# Patient Record
Sex: MALE | Race: WHITE | Employment: FULL TIME | URBAN - METROPOLITAN AREA
[De-identification: names, ages, dates, MRNs, and addresses within clinical notes are randomized per-mention and may not be internally consistent; named-entity substitution may affect disease eponyms.]

---

## 2017-08-16 ENCOUNTER — OFFICE VISIT (OUTPATIENT)
Dept: FAMILY MEDICINE CLINIC | Age: 27
End: 2017-08-16

## 2017-08-16 VITALS
WEIGHT: 228.1 LBS | TEMPERATURE: 97.8 F | HEIGHT: 68 IN | DIASTOLIC BLOOD PRESSURE: 84 MMHG | BODY MASS INDEX: 34.57 KG/M2 | HEART RATE: 96 BPM | OXYGEN SATURATION: 99 % | RESPIRATION RATE: 25 BRPM | SYSTOLIC BLOOD PRESSURE: 126 MMHG

## 2017-08-16 DIAGNOSIS — J02.0 ACUTE STREPTOCOCCAL PHARYNGITIS: Primary | ICD-10-CM

## 2017-08-16 DIAGNOSIS — J03.00 STREP TONSILLITIS: ICD-10-CM

## 2017-08-16 LAB — S PYO AG THROAT QL: POSITIVE

## 2017-08-16 PROCEDURE — G8427 DOCREV CUR MEDS BY ELIG CLIN: HCPCS | Performed by: NURSE PRACTITIONER

## 2017-08-16 PROCEDURE — 99213 OFFICE O/P EST LOW 20 MIN: CPT | Performed by: NURSE PRACTITIONER

## 2017-08-16 PROCEDURE — G8419 CALC BMI OUT NRM PARAM NOF/U: HCPCS | Performed by: NURSE PRACTITIONER

## 2017-08-16 PROCEDURE — 87880 STREP A ASSAY W/OPTIC: CPT | Performed by: NURSE PRACTITIONER

## 2017-08-16 PROCEDURE — 1036F TOBACCO NON-USER: CPT | Performed by: NURSE PRACTITIONER

## 2017-08-16 RX ORDER — PREDNISONE 10 MG/1
TABLET ORAL
Qty: 45 TABLET | Refills: 0 | Status: SHIPPED | OUTPATIENT
Start: 2017-08-16 | End: 2017-09-22 | Stop reason: ALTCHOICE

## 2017-08-16 RX ORDER — AMOXICILLIN AND CLAVULANATE POTASSIUM 875; 125 MG/1; MG/1
1 TABLET, FILM COATED ORAL 2 TIMES DAILY
Qty: 20 TABLET | Refills: 0 | Status: SHIPPED | OUTPATIENT
Start: 2017-08-16 | End: 2017-08-26

## 2017-08-16 ASSESSMENT — ENCOUNTER SYMPTOMS
CHANGE IN BOWEL HABIT: 0
VISUAL CHANGE: 0
VOMITING: 0
NAUSEA: 0
ABDOMINAL PAIN: 0
SORE THROAT: 1
SWOLLEN GLANDS: 1
COUGH: 0

## 2017-08-17 DIAGNOSIS — J02.0 ACUTE STREPTOCOCCAL PHARYNGITIS: ICD-10-CM

## 2017-08-17 DIAGNOSIS — J03.00 STREP TONSILLITIS: ICD-10-CM

## 2017-08-18 LAB
ORGANISM: ABNORMAL
THROAT CULTURE: ABNORMAL
THROAT CULTURE: ABNORMAL

## 2017-09-22 ENCOUNTER — OFFICE VISIT (OUTPATIENT)
Dept: FAMILY MEDICINE CLINIC | Age: 27
End: 2017-09-22

## 2017-09-22 VITALS
SYSTOLIC BLOOD PRESSURE: 126 MMHG | OXYGEN SATURATION: 98 % | HEART RATE: 98 BPM | BODY MASS INDEX: 35.31 KG/M2 | WEIGHT: 233 LBS | HEIGHT: 68 IN | DIASTOLIC BLOOD PRESSURE: 86 MMHG | TEMPERATURE: 97.6 F | RESPIRATION RATE: 16 BRPM

## 2017-09-22 DIAGNOSIS — A09 DIARRHEA OF INFECTIOUS ORIGIN: ICD-10-CM

## 2017-09-22 DIAGNOSIS — H91.92 LOW FREQUENCY HEARING LOSS, LEFT: ICD-10-CM

## 2017-09-22 DIAGNOSIS — E66.09 NON MORBID OBESITY DUE TO EXCESS CALORIES: ICD-10-CM

## 2017-09-22 DIAGNOSIS — J02.9 ACUTE PHARYNGITIS, UNSPECIFIED ETIOLOGY: Primary | ICD-10-CM

## 2017-09-22 DIAGNOSIS — G47.33 OSA (OBSTRUCTIVE SLEEP APNEA): ICD-10-CM

## 2017-09-22 PROCEDURE — G8417 CALC BMI ABV UP PARAM F/U: HCPCS | Performed by: FAMILY MEDICINE

## 2017-09-22 PROCEDURE — G8427 DOCREV CUR MEDS BY ELIG CLIN: HCPCS | Performed by: FAMILY MEDICINE

## 2017-09-22 PROCEDURE — 1036F TOBACCO NON-USER: CPT | Performed by: FAMILY MEDICINE

## 2017-09-22 PROCEDURE — 99214 OFFICE O/P EST MOD 30 MIN: CPT | Performed by: FAMILY MEDICINE

## 2017-09-22 ASSESSMENT — PATIENT HEALTH QUESTIONNAIRE - PHQ9
1. LITTLE INTEREST OR PLEASURE IN DOING THINGS: 0
SUM OF ALL RESPONSES TO PHQ9 QUESTIONS 1 & 2: 0
2. FEELING DOWN, DEPRESSED OR HOPELESS: 0
SUM OF ALL RESPONSES TO PHQ QUESTIONS 1-9: 0

## 2017-09-25 DIAGNOSIS — A09 DIARRHEA OF INFECTIOUS ORIGIN: ICD-10-CM

## 2017-09-26 LAB — CLOSTRIDIUM DIFFICILE DNA AMPLIFICATION: NORMAL

## 2018-11-20 ENCOUNTER — OFFICE VISIT (OUTPATIENT)
Dept: FAMILY MEDICINE CLINIC | Age: 28
End: 2018-11-20
Payer: COMMERCIAL

## 2018-11-20 VITALS
DIASTOLIC BLOOD PRESSURE: 82 MMHG | RESPIRATION RATE: 16 BRPM | BODY MASS INDEX: 36.37 KG/M2 | WEIGHT: 240 LBS | HEIGHT: 68 IN | HEART RATE: 80 BPM | TEMPERATURE: 97.3 F | SYSTOLIC BLOOD PRESSURE: 124 MMHG

## 2018-11-20 DIAGNOSIS — L03.313 CELLULITIS OF CHEST WALL: Primary | ICD-10-CM

## 2018-11-20 DIAGNOSIS — D22.9 NEVUS: ICD-10-CM

## 2018-11-20 PROCEDURE — 1036F TOBACCO NON-USER: CPT | Performed by: FAMILY MEDICINE

## 2018-11-20 PROCEDURE — 99213 OFFICE O/P EST LOW 20 MIN: CPT | Performed by: FAMILY MEDICINE

## 2018-11-20 PROCEDURE — G8484 FLU IMMUNIZE NO ADMIN: HCPCS | Performed by: FAMILY MEDICINE

## 2018-11-20 PROCEDURE — G8417 CALC BMI ABV UP PARAM F/U: HCPCS | Performed by: FAMILY MEDICINE

## 2018-11-20 PROCEDURE — G8427 DOCREV CUR MEDS BY ELIG CLIN: HCPCS | Performed by: FAMILY MEDICINE

## 2018-11-20 RX ORDER — AMOXICILLIN AND CLAVULANATE POTASSIUM 875; 125 MG/1; MG/1
1 TABLET, FILM COATED ORAL 2 TIMES DAILY
Qty: 20 TABLET | Refills: 0 | Status: SHIPPED | OUTPATIENT
Start: 2018-11-20 | End: 2018-11-30

## 2018-11-20 ASSESSMENT — PATIENT HEALTH QUESTIONNAIRE - PHQ9
SUM OF ALL RESPONSES TO PHQ QUESTIONS 1-9: 0
SUM OF ALL RESPONSES TO PHQ9 QUESTIONS 1 & 2: 0
1. LITTLE INTEREST OR PLEASURE IN DOING THINGS: 0
2. FEELING DOWN, DEPRESSED OR HOPELESS: 0
SUM OF ALL RESPONSES TO PHQ QUESTIONS 1-9: 0

## 2018-11-20 NOTE — LETTER
[facility]  [address] [address]  Phone: [phone]  Fax: [phone]    Mirtha Driver MD        November 20, 2018     Patient: Bereket Height   YOB: 1990   Date of Visit: 11/20/2018       To Whom it May Concern:    Ned Bucio was seen in my clinic on 11/20/2018. He will be returning to work 11/26/18. If you have any questions or concerns, please don't hesitate to call.     Sincerely,         Mirtha Driver MD

## 2019-03-12 ENCOUNTER — TELEPHONE (OUTPATIENT)
Dept: FAMILY MEDICINE CLINIC | Age: 29
End: 2019-03-12

## 2023-06-27 PROBLEM — J01.90 ACUTE SINUSITIS: Status: ACTIVE | Noted: 2023-06-27

## 2023-06-27 PROBLEM — J35.1 TONSILLAR HYPERTROPHY: Status: ACTIVE | Noted: 2023-06-27

## 2023-06-27 PROBLEM — J31.2 CHRONIC PHARYNGITIS: Status: ACTIVE | Noted: 2023-06-27

## 2023-06-30 ENCOUNTER — OFFICE VISIT (OUTPATIENT)
Dept: PRIMARY CARE | Facility: CLINIC | Age: 33
End: 2023-06-30
Payer: COMMERCIAL

## 2023-06-30 VITALS
SYSTOLIC BLOOD PRESSURE: 149 MMHG | OXYGEN SATURATION: 97 % | DIASTOLIC BLOOD PRESSURE: 86 MMHG | HEIGHT: 65 IN | WEIGHT: 252 LBS | TEMPERATURE: 97.6 F | BODY MASS INDEX: 41.99 KG/M2 | HEART RATE: 102 BPM

## 2023-06-30 DIAGNOSIS — Z00.00 ROUTINE HEALTH MAINTENANCE: ICD-10-CM

## 2023-06-30 DIAGNOSIS — R94.31 EKG ABNORMALITY: ICD-10-CM

## 2023-06-30 DIAGNOSIS — R07.9 CHEST PAIN, UNSPECIFIED TYPE: Primary | ICD-10-CM

## 2023-06-30 DIAGNOSIS — Z72.0 TOBACCO USE: ICD-10-CM

## 2023-06-30 DIAGNOSIS — R03.0 ELEVATED BLOOD PRESSURE READING: ICD-10-CM

## 2023-06-30 DIAGNOSIS — K21.9 GASTROESOPHAGEAL REFLUX DISEASE, UNSPECIFIED WHETHER ESOPHAGITIS PRESENT: ICD-10-CM

## 2023-06-30 DIAGNOSIS — M79.672 FOOT PAIN, BILATERAL: ICD-10-CM

## 2023-06-30 DIAGNOSIS — Z79.899 ON PRE-EXPOSURE PROPHYLAXIS FOR HIV: ICD-10-CM

## 2023-06-30 DIAGNOSIS — M79.671 FOOT PAIN, BILATERAL: ICD-10-CM

## 2023-06-30 DIAGNOSIS — F41.1 GAD (GENERALIZED ANXIETY DISORDER): ICD-10-CM

## 2023-06-30 DIAGNOSIS — Z00.00 HEALTHCARE MAINTENANCE: ICD-10-CM

## 2023-06-30 PROBLEM — E66.09 NON MORBID OBESITY DUE TO EXCESS CALORIES: Status: ACTIVE | Noted: 2017-09-22

## 2023-06-30 PROBLEM — D22.9 NEVUS: Status: ACTIVE | Noted: 2018-11-20

## 2023-06-30 PROBLEM — G47.33 OSA (OBSTRUCTIVE SLEEP APNEA): Status: ACTIVE | Noted: 2017-09-22

## 2023-06-30 PROCEDURE — 93000 ELECTROCARDIOGRAM COMPLETE: CPT | Performed by: STUDENT IN AN ORGANIZED HEALTH CARE EDUCATION/TRAINING PROGRAM

## 2023-06-30 PROCEDURE — 99204 OFFICE O/P NEW MOD 45 MIN: CPT | Performed by: STUDENT IN AN ORGANIZED HEALTH CARE EDUCATION/TRAINING PROGRAM

## 2023-06-30 RX ORDER — HYDROXYZINE HYDROCHLORIDE 25 MG/1
25 TABLET, FILM COATED ORAL 2 TIMES DAILY PRN
Qty: 60 TABLET | Refills: 0 | Status: SHIPPED | OUTPATIENT
Start: 2023-06-30 | End: 2023-07-30

## 2023-06-30 RX ORDER — OMEPRAZOLE 20 MG/1
20 CAPSULE, DELAYED RELEASE ORAL DAILY
COMMUNITY
End: 2023-11-03 | Stop reason: WASHOUT

## 2023-06-30 RX ORDER — ESCITALOPRAM OXALATE 10 MG/1
10 TABLET ORAL DAILY
Qty: 30 TABLET | Refills: 2 | Status: SHIPPED | OUTPATIENT
Start: 2023-06-30 | End: 2023-08-04 | Stop reason: ALTCHOICE

## 2023-06-30 RX ORDER — EMTRICITABINE AND TENOFOVIR DISOPROXIL FUMARATE 200; 300 MG/1; MG/1
1 TABLET, FILM COATED ORAL DAILY
COMMUNITY
Start: 2023-06-02

## 2023-06-30 ASSESSMENT — ANXIETY QUESTIONNAIRES
7. FEELING AFRAID AS IF SOMETHING AWFUL MIGHT HAPPEN: NEARLY EVERY DAY
5. BEING SO RESTLESS THAT IT IS HARD TO SIT STILL: NOT AT ALL
3. WORRYING TOO MUCH ABOUT DIFFERENT THINGS: NEARLY EVERY DAY
GAD7 TOTAL SCORE: 16
1. FEELING NERVOUS, ANXIOUS, OR ON EDGE: NEARLY EVERY DAY
6. BECOMING EASILY ANNOYED OR IRRITABLE: SEVERAL DAYS
IF YOU CHECKED OFF ANY PROBLEMS ON THIS QUESTIONNAIRE, HOW DIFFICULT HAVE THESE PROBLEMS MADE IT FOR YOU TO DO YOUR WORK, TAKE CARE OF THINGS AT HOME, OR GET ALONG WITH OTHER PEOPLE: SOMEWHAT DIFFICULT
2. NOT BEING ABLE TO STOP OR CONTROL WORRYING: NEARLY EVERY DAY
4. TROUBLE RELAXING: NEARLY EVERY DAY

## 2023-06-30 ASSESSMENT — ENCOUNTER SYMPTOMS
DIZZINESS: 1
PALPITATIONS: 0
VOMITING: 0
CHILLS: 0
LIGHT-HEADEDNESS: 0
SHORTNESS OF BREATH: 0
NAUSEA: 0
FEVER: 0
DYSURIA: 0
DIAPHORESIS: 0

## 2023-06-30 ASSESSMENT — PATIENT HEALTH QUESTIONNAIRE - PHQ9
SUM OF ALL RESPONSES TO PHQ9 QUESTIONS 1 AND 2: 0
1. LITTLE INTEREST OR PLEASURE IN DOING THINGS: NOT AT ALL
2. FEELING DOWN, DEPRESSED OR HOPELESS: NOT AT ALL

## 2023-06-30 NOTE — PATIENT INSTRUCTIONS
Please schedule a physical with me in 1 month on your way out.  Please go to patient [facility] which is located in the café to schedule the psychology referral, echocardiogram, and infectious disease referral  Please obtain fasting blood work at your nearest  lab.  Please fast for 8 hours prior to obtaining.  Start taking Lexapro once a day and hydroxyzine twice a day as needed for anxiety.  Let us know if you have any issues with the anxiety medicines.

## 2023-06-30 NOTE — PROGRESS NOTES
"Subjective   Patient ID: Bryant Goddard is a 33 y.o. male who presents for New Patient Visit.  He is here to establish care.    Reports hx of anxiety which has been worse than usual, more crying than usual. No hx of anxiety in childhood but more prominent in adulthood. No SI/HI. Feels safe at work and at home. Most anxiety is at work.    Has episodes of left shoulder pain and chest pain that lasts a few seconds and goes away. Feels a rush of dizziness and resolves after seconds. Unclear trigger.    Feet hurt in the AM at times and improves during the day.    PMH: GERD, elevated BP reading    Prescribed truvada through [facility].    Not sexually active. MSM when active.     Social History: Works for [employer]. Been there for about 6 years. Keeps in touch with sister over phone. Came to [place] for college and mom is from [place], parent's from here.        Review of Systems   Constitutional:  Negative for chills, diaphoresis and fever.   HENT:  Negative for hearing loss.    Eyes:  Negative for visual disturbance.   Respiratory:  Negative for shortness of breath.    Cardiovascular:  Positive for chest pain. Negative for palpitations and leg swelling.   Gastrointestinal:  Negative for nausea and vomiting.   Endocrine: Negative for cold intolerance and heat intolerance.   Genitourinary:  Negative for dysuria.   Skin:  Negative for rash.   Neurological:  Positive for dizziness. Negative for light-headedness.       /86   Pulse 102   Temp 36.4 °C (97.6 °F)   Ht 1.638 m (5' 4.5\")   Wt 114 kg (252 lb)   SpO2 97%   BMI 42.59 kg/m²     Objective   Physical Exam  Vitals reviewed.   Constitutional:       General: He is not in acute distress.     Appearance: Normal appearance. He is obese.   HENT:      Head: Normocephalic.   Cardiovascular:      Rate and Rhythm: Normal rate and regular rhythm.   Pulmonary:      Effort: Pulmonary effort is normal. No respiratory distress.      " Breath sounds: Normal breath sounds.   Abdominal:      General: There is no distension.   Musculoskeletal:         General: No deformity.   Skin:     Coloration: Skin is not jaundiced.   Neurological:      Mental Status: He is alert.         Assessment/Plan   Problem List Items Addressed This Visit       Elevated blood pressure reading     Recheck in 4-6 weeks.  Routine labs ordered         Relevant Orders    Transthoracic echo (TTE) complete    EKG abnormality    Relevant Orders    Transthoracic echo (TTE) complete    Chest pain - Primary     EKG ordered due to chest pain less likely to be cardiac given his history although he does have elevated blood pressure today.  EKG showed horizontal axis slight intraventricular conduction delay but otherwise unremarkable.  For this finding I have ordered an echo. ER if symptoms worsen         Relevant Orders    ECG 12 lead (Clinic Performed) (Completed)    Routine health maintenance    Relevant Orders    Lipid Panel    Comprehensive Metabolic Panel    TSH with reflex to Free T4 if abnormal    CBC    On pre-exposure prophylaxis for HIV     Referred to infectious disease for management of Truvada.         Relevant Orders    Referral to Infectious Disease    MANUEL (generalized anxiety disorder)     MANUEL-7 is 16 which is consistent with anxiety.  For this we will start Lexapro 10 mg a day, hydroxyzine 25 mg twice daily as needed for anxiety.  I have also referred him to psychology to establish with therapy.  Follow-up with me in 4 to 6 weeks to discuss efficacy.  Advised common side effects of SSRIs and let us know if the side effects have not.         Relevant Medications    escitalopram (Lexapro) 10 mg tablet    hydrOXYzine HCL (Atarax) 25 mg tablet    Other Relevant Orders    Referral to Psychology    Healthcare maintenance     Routine labs ordered.  Follow-up in 1 to 3 months for physical and to discuss anxiety or sooner if needed.         Relevant Orders    Follow Up In  [facility] - PCP - Health Maintenance    Foot pain, bilateral     Will address at next visit.         Gastroesophageal reflux disease     Continue omeprazole 20mg/day         Tobacco use     Advised to stop smoking.  We will continue to explore this at future visits.

## 2023-07-02 PROBLEM — Z79.899 ON PRE-EXPOSURE PROPHYLAXIS FOR HIV: Status: ACTIVE | Noted: 2023-07-02

## 2023-07-02 PROBLEM — K21.9 GASTROESOPHAGEAL REFLUX DISEASE: Status: ACTIVE | Noted: 2023-07-02

## 2023-07-02 PROBLEM — F41.1 GAD (GENERALIZED ANXIETY DISORDER): Status: ACTIVE | Noted: 2023-07-02

## 2023-07-02 PROBLEM — R03.0 ELEVATED BLOOD PRESSURE READING: Status: ACTIVE | Noted: 2023-07-02

## 2023-07-02 PROBLEM — Z72.0 TOBACCO USE: Status: ACTIVE | Noted: 2023-07-02

## 2023-07-02 PROBLEM — R07.9 CHEST PAIN: Status: ACTIVE | Noted: 2023-07-02

## 2023-07-02 PROBLEM — R94.31 EKG ABNORMALITY: Status: ACTIVE | Noted: 2023-07-02

## 2023-07-02 PROBLEM — M79.672 FOOT PAIN, BILATERAL: Status: ACTIVE | Noted: 2023-07-02

## 2023-07-02 PROBLEM — Z00.00 ROUTINE HEALTH MAINTENANCE: Status: ACTIVE | Noted: 2023-07-02

## 2023-07-02 PROBLEM — Z00.00 HEALTHCARE MAINTENANCE: Status: ACTIVE | Noted: 2023-07-02

## 2023-07-02 PROBLEM — M79.671 FOOT PAIN, BILATERAL: Status: ACTIVE | Noted: 2023-07-02

## 2023-07-02 NOTE — ASSESSMENT & PLAN NOTE
MANUEL-7 is 16 which is consistent with anxiety.  For this we will start Lexapro 10 mg a day, hydroxyzine 25 mg twice daily as needed for anxiety.  I have also referred him to psychology to establish with therapy.  Follow-up with me in 4 to 6 weeks to discuss efficacy.  Advised common side effects of SSRIs and let us know if the side effects have not.

## 2023-07-02 NOTE — ASSESSMENT & PLAN NOTE
EKG ordered due to chest pain less likely to be cardiac given his history although he does have elevated blood pressure today.  EKG showed horizontal axis slight intraventricular conduction delay but otherwise unremarkable.  For this finding I have ordered an echo. ER if symptoms worsen

## 2023-07-02 NOTE — ASSESSMENT & PLAN NOTE
Routine labs ordered.  Follow-up in 1 to 3 months for physical and to discuss anxiety or sooner if needed.

## 2023-07-21 ENCOUNTER — LAB (OUTPATIENT)
Dept: LAB | Facility: LAB | Age: 33
End: 2023-07-21
Payer: COMMERCIAL

## 2023-07-21 DIAGNOSIS — Z00.00 ROUTINE HEALTH MAINTENANCE: ICD-10-CM

## 2023-07-21 LAB
ALANINE AMINOTRANSFERASE (SGPT) (U/L) IN SER/PLAS: 30 U/L (ref 10–52)
ALBUMIN (G/DL) IN SER/PLAS: 4.8 G/DL (ref 3.4–5)
ALKALINE PHOSPHATASE (U/L) IN SER/PLAS: 73 U/L (ref 33–120)
ANION GAP IN SER/PLAS: 11 MMOL/L (ref 10–20)
ASPARTATE AMINOTRANSFERASE (SGOT) (U/L) IN SER/PLAS: 16 U/L (ref 9–39)
BILIRUBIN TOTAL (MG/DL) IN SER/PLAS: 1.1 MG/DL (ref 0–1.2)
CALCIUM (MG/DL) IN SER/PLAS: 9.5 MG/DL (ref 8.6–10.3)
CARBON DIOXIDE, TOTAL (MMOL/L) IN SER/PLAS: 30 MMOL/L (ref 21–32)
CHLORIDE (MMOL/L) IN SER/PLAS: 101 MMOL/L (ref 98–107)
CHOLESTEROL (MG/DL) IN SER/PLAS: 151 MG/DL (ref 0–199)
CHOLESTEROL IN HDL (MG/DL) IN SER/PLAS: 46.8 MG/DL
CHOLESTEROL/HDL RATIO: 3.2
CREATININE (MG/DL) IN SER/PLAS: 0.92 MG/DL (ref 0.5–1.3)
ERYTHROCYTE DISTRIBUTION WIDTH (RATIO) BY AUTOMATED COUNT: 12.9 % (ref 11.5–14.5)
ERYTHROCYTE MEAN CORPUSCULAR HEMOGLOBIN CONCENTRATION (G/DL) BY AUTOMATED: 35.2 G/DL (ref 32–36)
ERYTHROCYTE MEAN CORPUSCULAR VOLUME (FL) BY AUTOMATED COUNT: 84 FL (ref 80–100)
ERYTHROCYTES (10*6/UL) IN BLOOD BY AUTOMATED COUNT: 5.73 X10E12/L (ref 4.5–5.9)
GFR MALE: >90 ML/MIN/1.73M2
GLUCOSE (MG/DL) IN SER/PLAS: 87 MG/DL (ref 74–99)
HEMATOCRIT (%) IN BLOOD BY AUTOMATED COUNT: 48.3 % (ref 41–52)
HEMOGLOBIN (G/DL) IN BLOOD: 17 G/DL (ref 13.5–17.5)
LDL: 82 MG/DL (ref 0–99)
LEUKOCYTES (10*3/UL) IN BLOOD BY AUTOMATED COUNT: 7.7 X10E9/L (ref 4.4–11.3)
PLATELETS (10*3/UL) IN BLOOD AUTOMATED COUNT: 189 X10E9/L (ref 150–450)
POTASSIUM (MMOL/L) IN SER/PLAS: 4.3 MMOL/L (ref 3.5–5.3)
PROTEIN TOTAL: 7.5 G/DL (ref 6.4–8.2)
SODIUM (MMOL/L) IN SER/PLAS: 138 MMOL/L (ref 136–145)
THYROTROPIN (MIU/L) IN SER/PLAS BY DETECTION LIMIT <= 0.05 MIU/L: 1.07 MIU/L (ref 0.44–3.98)
TRIGLYCERIDE (MG/DL) IN SER/PLAS: 112 MG/DL (ref 0–149)
UREA NITROGEN (MG/DL) IN SER/PLAS: 17 MG/DL (ref 6–23)
VLDL: 22 MG/DL (ref 0–40)

## 2023-07-21 PROCEDURE — 80061 LIPID PANEL: CPT

## 2023-07-21 PROCEDURE — 84443 ASSAY THYROID STIM HORMONE: CPT

## 2023-07-21 PROCEDURE — 36415 COLL VENOUS BLD VENIPUNCTURE: CPT

## 2023-07-21 PROCEDURE — 80053 COMPREHEN METABOLIC PANEL: CPT

## 2023-07-21 PROCEDURE — 85027 COMPLETE CBC AUTOMATED: CPT

## 2023-08-02 ASSESSMENT — PROMIS GLOBAL HEALTH SCALE
RATE_MENTAL_HEALTH: VERY GOOD
RATE_PHYSICAL_HEALTH: FAIR
RATE_GENERAL_HEALTH: FAIR
RATE_AVERAGE_FATIGUE: MILD
RATE_QUALITY_OF_LIFE: VERY GOOD
RATE_SOCIAL_SATISFACTION: GOOD
RATE_AVERAGE_PAIN: 3
CARRYOUT_PHYSICAL_ACTIVITIES: COMPLETELY
EMOTIONAL_PROBLEMS: SOMETIMES
CARRYOUT_SOCIAL_ACTIVITIES: GOOD

## 2023-08-04 ENCOUNTER — OFFICE VISIT (OUTPATIENT)
Dept: PRIMARY CARE | Facility: CLINIC | Age: 33
End: 2023-08-04
Payer: COMMERCIAL

## 2023-08-04 VITALS
OXYGEN SATURATION: 97 % | HEIGHT: 65 IN | WEIGHT: 256.2 LBS | HEART RATE: 98 BPM | BODY MASS INDEX: 42.69 KG/M2 | SYSTOLIC BLOOD PRESSURE: 126 MMHG | DIASTOLIC BLOOD PRESSURE: 74 MMHG | TEMPERATURE: 96.7 F

## 2023-08-04 DIAGNOSIS — M79.671 FOOT PAIN, BILATERAL: ICD-10-CM

## 2023-08-04 DIAGNOSIS — R94.31 EKG ABNORMALITY: ICD-10-CM

## 2023-08-04 DIAGNOSIS — Z23 ENCOUNTER FOR IMMUNIZATION: ICD-10-CM

## 2023-08-04 DIAGNOSIS — Z79.899 ON PRE-EXPOSURE PROPHYLAXIS FOR HIV: ICD-10-CM

## 2023-08-04 DIAGNOSIS — K21.9 GASTROESOPHAGEAL REFLUX DISEASE, UNSPECIFIED WHETHER ESOPHAGITIS PRESENT: ICD-10-CM

## 2023-08-04 DIAGNOSIS — R07.9 CHEST PAIN, UNSPECIFIED TYPE: ICD-10-CM

## 2023-08-04 DIAGNOSIS — M79.672 FOOT PAIN, BILATERAL: ICD-10-CM

## 2023-08-04 DIAGNOSIS — Z72.0 TOBACCO USE: ICD-10-CM

## 2023-08-04 DIAGNOSIS — Z00.00 HEALTHCARE MAINTENANCE: ICD-10-CM

## 2023-08-04 DIAGNOSIS — F41.1 GAD (GENERALIZED ANXIETY DISORDER): Primary | ICD-10-CM

## 2023-08-04 PROCEDURE — 90471 IMMUNIZATION ADMIN: CPT | Performed by: STUDENT IN AN ORGANIZED HEALTH CARE EDUCATION/TRAINING PROGRAM

## 2023-08-04 PROCEDURE — 99395 PREV VISIT EST AGE 18-39: CPT | Performed by: STUDENT IN AN ORGANIZED HEALTH CARE EDUCATION/TRAINING PROGRAM

## 2023-08-04 PROCEDURE — 3008F BODY MASS INDEX DOCD: CPT | Performed by: STUDENT IN AN ORGANIZED HEALTH CARE EDUCATION/TRAINING PROGRAM

## 2023-08-04 PROCEDURE — 90715 TDAP VACCINE 7 YRS/> IM: CPT | Performed by: STUDENT IN AN ORGANIZED HEALTH CARE EDUCATION/TRAINING PROGRAM

## 2023-08-04 RX ORDER — ESCITALOPRAM OXALATE 20 MG/1
20 TABLET ORAL DAILY
Qty: 30 TABLET | Refills: 2 | Status: SHIPPED | OUTPATIENT
Start: 2023-08-04 | End: 2023-10-20

## 2023-08-04 ASSESSMENT — ENCOUNTER SYMPTOMS
CHILLS: 0
NUMBNESS: 0
UNEXPECTED WEIGHT CHANGE: 0
VOMITING: 0
SHORTNESS OF BREATH: 0
DIARRHEA: 0
DYSURIA: 0
FEVER: 0
DIAPHORESIS: 0
NAUSEA: 0

## 2023-08-04 ASSESSMENT — PATIENT HEALTH QUESTIONNAIRE - PHQ9
SUM OF ALL RESPONSES TO PHQ9 QUESTIONS 1 AND 2: 0
2. FEELING DOWN, DEPRESSED OR HOPELESS: NOT AT ALL
1. LITTLE INTEREST OR PLEASURE IN DOING THINGS: NOT AT ALL

## 2023-08-04 ASSESSMENT — ANXIETY QUESTIONNAIRES
7. FEELING AFRAID AS IF SOMETHING AWFUL MIGHT HAPPEN: SEVERAL DAYS
4. TROUBLE RELAXING: NOT AT ALL
2. NOT BEING ABLE TO STOP OR CONTROL WORRYING: SEVERAL DAYS
6. BECOMING EASILY ANNOYED OR IRRITABLE: SEVERAL DAYS
3. WORRYING TOO MUCH ABOUT DIFFERENT THINGS: SEVERAL DAYS
5. BEING SO RESTLESS THAT IT IS HARD TO SIT STILL: NOT AT ALL
1. FEELING NERVOUS, ANXIOUS, OR ON EDGE: MORE THAN HALF THE DAYS
GAD7 TOTAL SCORE: 6

## 2023-08-04 NOTE — PROGRESS NOTES
"Subjective   Patient ID: Bryant Goddard is a 33 y.o. male who presents for Annual Exam.  He is here for CPE.    Since starting lexapro, has still has some anxiety and requiring hydroxyzine. No loss of libido and no N/V/D. No SI/HI.     Has bilateral foot pain. Tries to wear shoes with supports. Has pain when waking up in bottom of the foot. Improves as the day goes on. Has tried inserts in shoes without benefit. Present for at least a year.     Shoulder/ chest pain is resolved.          Review of Systems   Constitutional:  Negative for chills, diaphoresis, fever and unexpected weight change.   HENT:  Negative for hearing loss.    Eyes:  Negative for visual disturbance.   Respiratory:  Negative for shortness of breath.    Cardiovascular:  Negative for chest pain.   Gastrointestinal:  Negative for diarrhea, nausea and vomiting.   Endocrine: Negative for cold intolerance and heat intolerance.   Genitourinary:  Negative for dysuria.   Skin:  Negative for rash.   Neurological:  Negative for numbness.       /74   Pulse 98   Temp 35.9 °C (96.7 °F)   Ht 1.638 m (5' 4.5\")   Wt 116 kg (256 lb 3.2 oz)   SpO2 97%   BMI 43.30 kg/m²     Objective   Physical Exam  Vitals reviewed.   Constitutional:       General: He is not in acute distress.     Appearance: Normal appearance.   HENT:      Head: Normocephalic.      Right Ear: Tympanic membrane, ear canal and external ear normal. There is no impacted cerumen.      Left Ear: Ear canal and external ear normal. There is impacted cerumen.      Mouth/Throat:      Mouth: Mucous membranes are moist.      Pharynx: Oropharynx is clear. No oropharyngeal exudate or posterior oropharyngeal erythema.   Cardiovascular:      Rate and Rhythm: Normal rate and regular rhythm.   Pulmonary:      Effort: Pulmonary effort is normal. No respiratory distress.      Breath sounds: Normal breath sounds.   Abdominal:      General: Bowel sounds are normal. There is no distension.      Palpations: " Abdomen is soft. There is no mass.      Tenderness: There is no abdominal tenderness. There is no guarding or rebound.   Musculoskeletal:         General: No deformity.      Cervical back: Neck supple. No tenderness.   Lymphadenopathy:      Cervical: No cervical adenopathy.   Skin:     Coloration: Skin is not jaundiced.   Neurological:      Mental Status: He is alert.         Assessment/Plan   Problem List Items Addressed This Visit       EKG abnormality    Chest pain    On pre-exposure prophylaxis for HIV    MANUEL (generalized anxiety disorder) - Primary    Relevant Medications    escitalopram (Lexapro) 20 mg tablet    Healthcare maintenance    Relevant Orders    Follow Up In [facility] - PCP - Established    Follow Up In [facility] - PCP - Health Maintenance    Foot pain, bilateral    Gastroesophageal reflux disease    BMI 40.0-44.9, adult (CMS/[facility])    Tobacco use    Encounter for immunization    Relevant Orders    Tdap vaccine, age 10 years and older  (BOOSTRIX) (Completed)   Hypertension  Chest pain  EKG abnormality  -Resolved. BP normal today  -Echo normal.    MANUEL  -MANUEL-7 16-> 6. Tolerating lexapro well.  -Increase lexapro to 20mg/day. Use hydroxyzine only PRN and try to wean.    On preexposure prophylaxis for HIV  -Referred to ID for truvada    Foot pain  -Likely plantar fasciitis.   -Advised stretches and work on wt loss.  -Can refer to podiatry if persistent.     BMI 43  -He will continue to work on diet and exercise.  - Follow-up with me in 3 months to discuss weight loss if needed.    GERD  - Continue omeprazole    Tobacco use  - He will continue to try and cut back on smoking.    CPE completed.  Advised to keep a heart healthy, low fat  diet with fruits and veggies like Mediterranean diet.  Advised on the importance of exercise and maintaining 150 minutes of exercise per week (30 minutes per day 5 days a week).  Advised on regular eye and dental visits.  Discussed age appropriate  cancer screening, immunizations and recommendations given.  Discussed avoiding illicit drugs and tobacco. Advised on appropriate use of alcohol.  Advised to wear seat belt.    Health Maintenance  -Prostate Cancer Screening: Age 50  -Vaccinations: Unsure last TDAP-ordered. Rec flu after labor day. Rec covid bivalent booster.   -Lung Cancer Screening:  Not indicated  -AAA Screening: Age 65  -Colonoscopy: Age 45  -Screen for HIV/Hep C: Truvada, reports screened in past.

## 2023-08-05 PROBLEM — Z23 ENCOUNTER FOR IMMUNIZATION: Status: ACTIVE | Noted: 2023-08-05

## 2023-10-20 DIAGNOSIS — F41.1 GAD (GENERALIZED ANXIETY DISORDER): ICD-10-CM

## 2023-10-20 RX ORDER — ESCITALOPRAM OXALATE 20 MG/1
20 TABLET ORAL DAILY
Qty: 30 TABLET | Refills: 11 | Status: SHIPPED | OUTPATIENT
Start: 2023-10-20

## 2023-11-03 ENCOUNTER — OFFICE VISIT (OUTPATIENT)
Dept: PRIMARY CARE | Facility: CLINIC | Age: 33
End: 2023-11-03
Payer: COMMERCIAL

## 2023-11-03 VITALS
SYSTOLIC BLOOD PRESSURE: 128 MMHG | OXYGEN SATURATION: 98 % | HEIGHT: 67 IN | BODY MASS INDEX: 41.44 KG/M2 | HEART RATE: 97 BPM | DIASTOLIC BLOOD PRESSURE: 84 MMHG | RESPIRATION RATE: 16 BRPM | WEIGHT: 264 LBS

## 2023-11-03 DIAGNOSIS — K21.9 GASTROESOPHAGEAL REFLUX DISEASE, UNSPECIFIED WHETHER ESOPHAGITIS PRESENT: ICD-10-CM

## 2023-11-03 DIAGNOSIS — Z00.00 HEALTHCARE MAINTENANCE: ICD-10-CM

## 2023-11-03 DIAGNOSIS — F41.1 GAD (GENERALIZED ANXIETY DISORDER): Primary | ICD-10-CM

## 2023-11-03 PROBLEM — E66.01 OBESITY, MORBID (MORE THAN 100 LBS OVER IDEAL WEIGHT OR BMI > 40) (MULTI): Status: ACTIVE | Noted: 2023-11-03

## 2023-11-03 PROCEDURE — 3008F BODY MASS INDEX DOCD: CPT | Performed by: STUDENT IN AN ORGANIZED HEALTH CARE EDUCATION/TRAINING PROGRAM

## 2023-11-03 PROCEDURE — 99213 OFFICE O/P EST LOW 20 MIN: CPT | Performed by: STUDENT IN AN ORGANIZED HEALTH CARE EDUCATION/TRAINING PROGRAM

## 2023-11-03 ASSESSMENT — ENCOUNTER SYMPTOMS
CHILLS: 0
FEVER: 0
VOMITING: 0
DIZZINESS: 0
DIAPHORESIS: 0
DYSURIA: 0
SHORTNESS OF BREATH: 0
DIARRHEA: 0
LIGHT-HEADEDNESS: 0
NAUSEA: 0

## 2023-11-03 ASSESSMENT — ANXIETY QUESTIONNAIRES
2. NOT BEING ABLE TO STOP OR CONTROL WORRYING: SEVERAL DAYS
4. TROUBLE RELAXING: NOT AT ALL
1. FEELING NERVOUS, ANXIOUS, OR ON EDGE: SEVERAL DAYS
7. FEELING AFRAID AS IF SOMETHING AWFUL MIGHT HAPPEN: NOT AT ALL
GAD7 TOTAL SCORE: 3
3. WORRYING TOO MUCH ABOUT DIFFERENT THINGS: SEVERAL DAYS
5. BEING SO RESTLESS THAT IT IS HARD TO SIT STILL: NOT AT ALL
6. BECOMING EASILY ANNOYED OR IRRITABLE: NOT AT ALL

## 2023-11-03 ASSESSMENT — PATIENT HEALTH QUESTIONNAIRE - PHQ9
SUM OF ALL RESPONSES TO PHQ9 QUESTIONS 1 & 2: 0
1. LITTLE INTEREST OR PLEASURE IN DOING THINGS: NOT AT ALL
2. FEELING DOWN, DEPRESSED OR HOPELESS: NOT AT ALL

## 2023-11-03 NOTE — PROGRESS NOTES
"Subjective   Patient ID: Bryant Goddard is a 33 y.o. male who presents for Anxiety.    Pt is here today to follow upon anxiety. Pt had his lexapro increased to 20 mg.pt states that he thinks this dosage is working well for him. He still has breakthrough anxiety but note as much as he used to. Pt admits that he does forghet to take it sometimes but has been trying to be more consistant. Was going to the gym and eating healthy and lost 10 pounds. Has used hydroxyzine in the past but not using very frequently, helps some but sometimes feels he over sleeps with it.         Review of Systems   Constitutional:  Negative for chills, diaphoresis and fever.   HENT:  Negative for hearing loss.    Eyes:  Negative for visual disturbance.   Respiratory:  Negative for shortness of breath.    Cardiovascular:  Negative for chest pain.   Gastrointestinal:  Negative for diarrhea, nausea and vomiting.   Endocrine: Negative for cold intolerance and heat intolerance.   Genitourinary:  Negative for dysuria.   Skin:  Negative for rash.   Neurological:  Negative for dizziness and light-headedness.   Psychiatric/Behavioral:  Negative for suicidal ideas.        Objective   /84   Pulse 97   Resp 16   Ht 1.702 m (5' 7\")   Wt 120 kg (264 lb)   SpO2 98%   BMI 41.35 kg/m²     Physical Exam  Vitals reviewed.   Constitutional:       General: He is not in acute distress.     Appearance: Normal appearance.   HENT:      Head: Normocephalic.   Cardiovascular:      Rate and Rhythm: Normal rate and regular rhythm.   Pulmonary:      Effort: Pulmonary effort is normal. No respiratory distress.      Breath sounds: Normal breath sounds.   Abdominal:      General: There is no distension.   Musculoskeletal:         General: No deformity.   Skin:     Coloration: Skin is not jaundiced.   Neurological:      Mental Status: He is alert.         Assessment/Plan   Problem List Items Addressed This Visit       MANUEL (generalized anxiety disorder) - Primary "    Healthcare maintenance    Gastroesophageal reflux disease    BMI 40.0-44.9, adult (CMS/HCC)   Hypertension  Chest pain  EKG abnormality  -Resolved. BP normal today  -Echo normal.     MANEUL  -MANUEL-7 16-> 6 ->3. Tolerating lexapro well.  -Continue lexapro 20mg/day. Use hydroxyzine only PRN and try to wean. Discussed could try hydroxyzine 10mg PRN and will message us if he wants to try it.   -F/u virtually for anxiety if needed.     On preexposure prophylaxis for HIV  -Referred to ID for truvada, hasn't seen yet     Foot pain  -Likely plantar fasciitis.   -Advised stretches and work on wt loss.  -Can refer to podiatry if persistent. Resolved today.     BMI 41  -He will continue to work on diet and exercise.  - Follow-up with me in 3 months to discuss weight loss if needed.     GERD  - Was on omeprazole in the past.  - Apple cider vinegar helps this. Discussed could try pepcid. Let us know if symptoms return or worsen.      Tobacco use  - He will continue to try and cut back on smoking. Not using currently    Health Maintenance  -Prostate Cancer Screening: Age 50  -Vaccinations: Unsure last TDAP-ordered. Rec flu after labor day. Rec covid bivalent booster.   -Lung Cancer Screening:  Not indicated  -AAA Screening: Age 65  -Colonoscopy: Age 45  -Screen for HIV/Hep C: Truvada, reports screened in past.     CPE 8/24, f/u sooner if needed.

## 2024-06-14 DIAGNOSIS — H92.09 OTALGIA, UNSPECIFIED LATERALITY: Primary | ICD-10-CM

## 2024-06-19 ENCOUNTER — TELEPHONE (OUTPATIENT)
Dept: PRIMARY CARE | Facility: CLINIC | Age: 34
End: 2024-06-19
Payer: COMMERCIAL

## 2024-06-19 NOTE — TELEPHONE ENCOUNTER
Patient was seen at an urgent care for wax removal 2 weeks ago bp was checked at that visit reading was 134/90       Patient advised he has been checking bp at home some reading were 155/80, 149/86 pulse 90      Has follow up for bp issues 6/21 with Dr. Wood

## 2024-06-21 ENCOUNTER — OFFICE VISIT (OUTPATIENT)
Dept: PRIMARY CARE | Facility: CLINIC | Age: 34
End: 2024-06-21
Payer: COMMERCIAL

## 2024-06-21 VITALS
RESPIRATION RATE: 16 BRPM | SYSTOLIC BLOOD PRESSURE: 156 MMHG | HEART RATE: 100 BPM | OXYGEN SATURATION: 99 % | BODY MASS INDEX: 41.75 KG/M2 | DIASTOLIC BLOOD PRESSURE: 98 MMHG | HEIGHT: 67 IN | WEIGHT: 266 LBS

## 2024-06-21 DIAGNOSIS — Z00.00 ROUTINE HEALTH MAINTENANCE: Primary | ICD-10-CM

## 2024-06-21 DIAGNOSIS — R03.0 ELEVATED BLOOD PRESSURE READING: ICD-10-CM

## 2024-06-21 DIAGNOSIS — F41.1 GAD (GENERALIZED ANXIETY DISORDER): ICD-10-CM

## 2024-06-21 ASSESSMENT — ENCOUNTER SYMPTOMS
VOMITING: 0
DIZZINESS: 0
SHORTNESS OF BREATH: 0
NUMBNESS: 0
DIAPHORESIS: 0
PALPITATIONS: 0
MYALGIAS: 0
CHILLS: 0
FEVER: 0
WEAKNESS: 0
LIGHT-HEADEDNESS: 0
NAUSEA: 0
DIARRHEA: 0

## 2024-06-21 ASSESSMENT — ANXIETY QUESTIONNAIRES
7. FEELING AFRAID AS IF SOMETHING AWFUL MIGHT HAPPEN: NOT AT ALL
5. BEING SO RESTLESS THAT IT IS HARD TO SIT STILL: NOT AT ALL
1. FEELING NERVOUS, ANXIOUS, OR ON EDGE: SEVERAL DAYS
6. BECOMING EASILY ANNOYED OR IRRITABLE: SEVERAL DAYS
2. NOT BEING ABLE TO STOP OR CONTROL WORRYING: NOT AT ALL
4. TROUBLE RELAXING: NOT AT ALL
3. WORRYING TOO MUCH ABOUT DIFFERENT THINGS: NOT AT ALL
GAD7 TOTAL SCORE: 2

## 2024-06-21 NOTE — PROGRESS NOTES
"Subjective   Patient ID: Bryant Goddard is a 34 y.o. male who presents for Hypertension (/).  HPI    Here for f/u HTN.    Had a few short lived episodes of chest pain that lasted a few seconds and then resolved spontaneously. Provided BP readings over last few days. Would wait for at least 5 minutes and take BP. He has an omron cuff at home. Doesn't feel anxious.     Life is going ok. Feels better than he was in last year. Off lexapro. Work is going ok. Not in relationship.     Doesn't do much physical activity, hasn't gone to gym recently. Diet at home can vary and does get fast food on occasion. Drinks a lot of diet coke, may have up to 12 diet cokes a day. Drinks coffee with cream and sugar, 1-2 a day.        Review of Systems   Constitutional:  Negative for chills, diaphoresis and fever.   Respiratory:  Negative for shortness of breath.    Cardiovascular:  Negative for chest pain, palpitations and leg swelling.   Gastrointestinal:  Negative for diarrhea, nausea and vomiting.   Musculoskeletal:  Negative for myalgias.   Neurological:  Negative for dizziness, syncope, weakness, light-headedness and numbness.       BP (!) 156/98   Pulse 100   Resp 16   Ht 1.702 m (5' 7\")   Wt 121 kg (266 lb)   SpO2 99%   BMI 41.66 kg/m²     Objective   Physical Exam  Vitals reviewed.   Constitutional:       General: He is not in acute distress.     Appearance: Normal appearance.   HENT:      Head: Normocephalic.   Cardiovascular:      Rate and Rhythm: Normal rate and regular rhythm.   Pulmonary:      Effort: Pulmonary effort is normal. No respiratory distress.      Breath sounds: Normal breath sounds.   Abdominal:      General: There is no distension.   Musculoskeletal:         General: No deformity.   Skin:     Coloration: Skin is not jaundiced.   Neurological:      Mental Status: He is alert.         Assessment/Plan   Problem List Items Addressed This Visit       Elevated blood pressure reading    Routine health maintenance " - Primary    Relevant Orders    Lipid Panel    Comprehensive Metabolic Panel    CBC    TSH with reflex to Free T4 if abnormal    MANUEL (generalized anxiety disorder)    BMI 40.0-44.9, adult (Multi)         Elevated blood pressure reading  -Home readings over last 2-3 days, in office reading elevated. Could be related to underlying htn but also drinking equivalent of about 7.5 cups of coffee/day.   -Discussed diet coke and coffee likely playing large role, needs to cut back  -Try to cut diet coke consumption in half and with every can of coke drink 1 glass of water, drink plenty of water  -Monitor bp at home, message in 2 weeks with readings and can start BP med prior to next appt.   -F/u 6 weeks in person    Chest pain  EKG abnormality  -Lasts seconds, not major concern   -Echo normal.     MANUEL  -MANUEL-7 16-> 6 ->3 -> 4 off meds.   -Did well with lexapro 20mg/day and hydroxyzine.    BMI 41  -He will continue to work on diet and exercise, defers meds     Fasting labs ordered  F/u 6 weeks CPE, sooner PRN

## 2024-06-21 NOTE — PATIENT INSTRUCTIONS
Please fast 8 hours before getting blood work.  Take your blood pressure once a day. Message me in 2 weeks with readings as we may want to start blood pressure medication.

## 2024-06-24 ENCOUNTER — LAB (OUTPATIENT)
Dept: LAB | Facility: LAB | Age: 34
End: 2024-06-24
Payer: COMMERCIAL

## 2024-06-24 DIAGNOSIS — Z00.00 ROUTINE HEALTH MAINTENANCE: ICD-10-CM

## 2024-06-24 LAB
ALBUMIN SERPL BCP-MCNC: 4.8 G/DL (ref 3.4–5)
ALP SERPL-CCNC: 62 U/L (ref 33–120)
ALT SERPL W P-5'-P-CCNC: 26 U/L (ref 10–52)
ANION GAP SERPL CALC-SCNC: 12 MMOL/L (ref 10–20)
AST SERPL W P-5'-P-CCNC: 15 U/L (ref 9–39)
BILIRUB SERPL-MCNC: 1 MG/DL (ref 0–1.2)
BUN SERPL-MCNC: 11 MG/DL (ref 6–23)
CALCIUM SERPL-MCNC: 9.5 MG/DL (ref 8.6–10.3)
CHLORIDE SERPL-SCNC: 102 MMOL/L (ref 98–107)
CHOLEST SERPL-MCNC: 140 MG/DL (ref 0–199)
CHOLESTEROL/HDL RATIO: 3.4
CO2 SERPL-SCNC: 28 MMOL/L (ref 21–32)
CREAT SERPL-MCNC: 0.83 MG/DL (ref 0.5–1.3)
EGFRCR SERPLBLD CKD-EPI 2021: >90 ML/MIN/1.73M*2
ERYTHROCYTE [DISTWIDTH] IN BLOOD BY AUTOMATED COUNT: 13.1 % (ref 11.5–14.5)
GLUCOSE SERPL-MCNC: 87 MG/DL (ref 74–99)
HCT VFR BLD AUTO: 47.4 % (ref 41–52)
HDLC SERPL-MCNC: 41.4 MG/DL
HGB BLD-MCNC: 16.7 G/DL (ref 13.5–17.5)
LDLC SERPL CALC-MCNC: 73 MG/DL
MCH RBC QN AUTO: 29.6 PG (ref 26–34)
MCHC RBC AUTO-ENTMCNC: 35.2 G/DL (ref 32–36)
MCV RBC AUTO: 84 FL (ref 80–100)
NON HDL CHOLESTEROL: 99 MG/DL (ref 0–149)
NRBC BLD-RTO: 0 /100 WBCS (ref 0–0)
PLATELET # BLD AUTO: 197 X10*3/UL (ref 150–450)
POTASSIUM SERPL-SCNC: 4 MMOL/L (ref 3.5–5.3)
PROT SERPL-MCNC: 7.4 G/DL (ref 6.4–8.2)
RBC # BLD AUTO: 5.64 X10*6/UL (ref 4.5–5.9)
SODIUM SERPL-SCNC: 138 MMOL/L (ref 136–145)
TRIGL SERPL-MCNC: 128 MG/DL (ref 0–149)
TSH SERPL-ACNC: 1.41 MIU/L (ref 0.44–3.98)
VLDL: 26 MG/DL (ref 0–40)
WBC # BLD AUTO: 7.2 X10*3/UL (ref 4.4–11.3)

## 2024-06-24 PROCEDURE — 84443 ASSAY THYROID STIM HORMONE: CPT

## 2024-06-24 PROCEDURE — 85027 COMPLETE CBC AUTOMATED: CPT

## 2024-06-24 PROCEDURE — 80053 COMPREHEN METABOLIC PANEL: CPT

## 2024-06-24 PROCEDURE — 80061 LIPID PANEL: CPT

## 2024-06-24 PROCEDURE — 36415 COLL VENOUS BLD VENIPUNCTURE: CPT

## 2024-07-05 DIAGNOSIS — I10 PRIMARY HYPERTENSION: Primary | ICD-10-CM

## 2024-07-05 RX ORDER — LOSARTAN POTASSIUM 50 MG/1
50 TABLET ORAL DAILY
Qty: 30 TABLET | Refills: 2 | Status: SHIPPED | OUTPATIENT
Start: 2024-07-05 | End: 2024-10-03

## 2024-07-26 ENCOUNTER — LAB (OUTPATIENT)
Dept: LAB | Facility: LAB | Age: 34
End: 2024-07-26
Payer: COMMERCIAL

## 2024-07-26 DIAGNOSIS — I10 PRIMARY HYPERTENSION: ICD-10-CM

## 2024-07-26 LAB
ANION GAP SERPL CALC-SCNC: 13 MMOL/L (ref 10–20)
BUN SERPL-MCNC: 14 MG/DL (ref 6–23)
CALCIUM SERPL-MCNC: 9.4 MG/DL (ref 8.6–10.3)
CHLORIDE SERPL-SCNC: 101 MMOL/L (ref 98–107)
CO2 SERPL-SCNC: 27 MMOL/L (ref 21–32)
CREAT SERPL-MCNC: 0.81 MG/DL (ref 0.5–1.3)
EGFRCR SERPLBLD CKD-EPI 2021: >90 ML/MIN/1.73M*2
GLUCOSE SERPL-MCNC: 91 MG/DL (ref 74–99)
POTASSIUM SERPL-SCNC: 4 MMOL/L (ref 3.5–5.3)
SODIUM SERPL-SCNC: 137 MMOL/L (ref 136–145)

## 2024-07-26 PROCEDURE — 36415 COLL VENOUS BLD VENIPUNCTURE: CPT

## 2024-07-26 PROCEDURE — 80048 BASIC METABOLIC PNL TOTAL CA: CPT

## 2024-08-02 ENCOUNTER — APPOINTMENT (OUTPATIENT)
Dept: PRIMARY CARE | Facility: CLINIC | Age: 34
End: 2024-08-02
Payer: COMMERCIAL

## 2024-08-02 VITALS
SYSTOLIC BLOOD PRESSURE: 118 MMHG | RESPIRATION RATE: 14 BRPM | WEIGHT: 265 LBS | DIASTOLIC BLOOD PRESSURE: 80 MMHG | BODY MASS INDEX: 41.59 KG/M2 | OXYGEN SATURATION: 99 % | TEMPERATURE: 97.6 F | HEART RATE: 74 BPM | HEIGHT: 67 IN

## 2024-08-02 DIAGNOSIS — I10 PRIMARY HYPERTENSION: ICD-10-CM

## 2024-08-02 DIAGNOSIS — F41.1 GAD (GENERALIZED ANXIETY DISORDER): ICD-10-CM

## 2024-08-02 DIAGNOSIS — Z00.00 HEALTHCARE MAINTENANCE: ICD-10-CM

## 2024-08-02 PROCEDURE — 99395 PREV VISIT EST AGE 18-39: CPT | Performed by: STUDENT IN AN ORGANIZED HEALTH CARE EDUCATION/TRAINING PROGRAM

## 2024-08-02 PROCEDURE — 3079F DIAST BP 80-89 MM HG: CPT | Performed by: STUDENT IN AN ORGANIZED HEALTH CARE EDUCATION/TRAINING PROGRAM

## 2024-08-02 PROCEDURE — 1036F TOBACCO NON-USER: CPT | Performed by: STUDENT IN AN ORGANIZED HEALTH CARE EDUCATION/TRAINING PROGRAM

## 2024-08-02 PROCEDURE — 3008F BODY MASS INDEX DOCD: CPT | Performed by: STUDENT IN AN ORGANIZED HEALTH CARE EDUCATION/TRAINING PROGRAM

## 2024-08-02 PROCEDURE — 3074F SYST BP LT 130 MM HG: CPT | Performed by: STUDENT IN AN ORGANIZED HEALTH CARE EDUCATION/TRAINING PROGRAM

## 2024-08-02 ASSESSMENT — PATIENT HEALTH QUESTIONNAIRE - PHQ9
1. LITTLE INTEREST OR PLEASURE IN DOING THINGS: NOT AT ALL
2. FEELING DOWN, DEPRESSED OR HOPELESS: NOT AT ALL
SUM OF ALL RESPONSES TO PHQ9 QUESTIONS 1 AND 2: 0

## 2024-08-02 ASSESSMENT — ENCOUNTER SYMPTOMS
MYALGIAS: 0
DYSURIA: 0
SHORTNESS OF BREATH: 0
VOMITING: 0
LIGHT-HEADEDNESS: 0
DIZZINESS: 0
FEVER: 0
NAUSEA: 0
UNEXPECTED WEIGHT CHANGE: 0
CHILLS: 0
DIARRHEA: 0
DIAPHORESIS: 0

## 2024-08-02 NOTE — PROGRESS NOTES
"Subjective   Patient ID: Bryant Goddard is a 34 y.o. male who presents for Annual Exam.  HPI    Here for CPE.    Reports eats healthy. Doesn't eat breakfast, lunch will sometimes skip and eats leftovers or quick food. Always has a vegetable and rice for dinner with meat, salmon or chicken. Hasn't been to gym much as of recent.       Review of Systems   Constitutional:  Negative for chills, diaphoresis, fever and unexpected weight change.   HENT:  Negative for hearing loss.    Eyes:  Negative for visual disturbance.   Respiratory:  Negative for shortness of breath.    Cardiovascular:  Negative for chest pain.   Gastrointestinal:  Negative for diarrhea, nausea and vomiting.   Endocrine: Negative for cold intolerance and heat intolerance.   Genitourinary:  Negative for dysuria, scrotal swelling and testicular pain.   Musculoskeletal:  Negative for myalgias.   Skin:  Negative for rash.   Neurological:  Negative for dizziness, syncope and light-headedness.       /80   Pulse 74   Temp 36.4 °C (97.6 °F) (Temporal)   Resp 14   Ht 1.702 m (5' 7\")   Wt 120 kg (265 lb)   SpO2 99%   BMI 41.50 kg/m²     Objective   Physical Exam  Vitals reviewed.   Constitutional:       General: He is not in acute distress.     Appearance: Normal appearance.   HENT:      Head: Normocephalic.      Right Ear: Tympanic membrane, ear canal and external ear normal. There is no impacted cerumen.      Left Ear: Tympanic membrane, ear canal and external ear normal. There is no impacted cerumen.      Mouth/Throat:      Mouth: Mucous membranes are moist.      Pharynx: Oropharynx is clear. No oropharyngeal exudate or posterior oropharyngeal erythema.   Cardiovascular:      Rate and Rhythm: Normal rate and regular rhythm.   Pulmonary:      Effort: Pulmonary effort is normal. No respiratory distress.      Breath sounds: Normal breath sounds.   Abdominal:      General: Bowel sounds are normal. There is no distension.      Palpations: Abdomen is " soft. There is no mass.      Tenderness: There is no abdominal tenderness. There is no guarding or rebound.   Musculoskeletal:         General: No deformity.      Cervical back: Neck supple. No tenderness.   Lymphadenopathy:      Cervical: No cervical adenopathy.   Skin:     Coloration: Skin is not jaundiced.   Neurological:      Mental Status: He is alert.         Assessment/Plan   Problem List Items Addressed This Visit       MANUEL (generalized anxiety disorder)    Healthcare maintenance    BMI 40.0-44.9, adult (Multi) - Primary    Primary hypertension         Hypertension  -Controlled on losartan, continue.   -He is trying to cut back on diet coke.  -As weight comes down hopefully losartan need to decrease as well.  If lightheaded at home check blood pressure and let us know.     Chest pain  EKG abnormality  -Lasts seconds, not major concern   -Echo normal.  -Resolved     MANUEL  -MANUEL-7 16-> 6 ->3 -> 4 and now is off lexapro. Uses hydroxyzine as needed.   -Historically did well with lexapro 20mg/day and hydroxyzine.     BMI 41  -He will continue to work on diet and exercise, defers meds   -Discussed how diet, exercise, stress sleep play a role.  -Defers nutrition referral  -F/u 3 months.      CPE completed.  Advised to keep a heart healthy, low fat  diet with fruits and veggies like Mediterranean diet.  Advised on the importance of exercise and maintaining 150 minutes of exercise per week (30 minutes per day 5 days a week).  Advised on regular eye and dental visits.  Discussed age appropriate cancer screening, immunizations and recommendations given.  Discussed avoiding illicit drugs and tobacco. Advised on appropriate use of alcohol.  Advised to wear seat belt.     Health Maintenance  -Prostate Cancer Screening: Age 50  -Vaccinations: TDAP done 2023, due 2033. Rec flu after labor day. Rec covid booster.   -Lung Cancer Screening:  Not indicated  -AAA Screening: Age 65  -Colonoscopy: Age 45  -Screen for HIV/Hep C:  Reports screened in past.     CPE 1 year  Follow-up 3 months

## 2024-09-25 DIAGNOSIS — I10 PRIMARY HYPERTENSION: ICD-10-CM

## 2024-09-25 RX ORDER — LOSARTAN POTASSIUM 50 MG/1
50 TABLET ORAL DAILY
Qty: 30 TABLET | Refills: 3 | Status: SHIPPED | OUTPATIENT
Start: 2024-09-25

## 2024-11-08 ENCOUNTER — APPOINTMENT (OUTPATIENT)
Dept: PRIMARY CARE | Facility: CLINIC | Age: 34
End: 2024-11-08
Payer: COMMERCIAL

## 2024-11-08 VITALS
BODY MASS INDEX: 42.22 KG/M2 | SYSTOLIC BLOOD PRESSURE: 114 MMHG | DIASTOLIC BLOOD PRESSURE: 80 MMHG | TEMPERATURE: 97.2 F | WEIGHT: 269 LBS | OXYGEN SATURATION: 97 % | HEIGHT: 67 IN | HEART RATE: 93 BPM | RESPIRATION RATE: 12 BRPM

## 2024-11-08 DIAGNOSIS — F41.1 GAD (GENERALIZED ANXIETY DISORDER): ICD-10-CM

## 2024-11-08 DIAGNOSIS — R06.81 WITNESSED EPISODE OF APNEA: Primary | ICD-10-CM

## 2024-11-08 DIAGNOSIS — R06.83 SNORING: ICD-10-CM

## 2024-11-08 PROCEDURE — 3008F BODY MASS INDEX DOCD: CPT | Performed by: STUDENT IN AN ORGANIZED HEALTH CARE EDUCATION/TRAINING PROGRAM

## 2024-11-08 PROCEDURE — 99214 OFFICE O/P EST MOD 30 MIN: CPT | Performed by: STUDENT IN AN ORGANIZED HEALTH CARE EDUCATION/TRAINING PROGRAM

## 2024-11-08 PROCEDURE — 3079F DIAST BP 80-89 MM HG: CPT | Performed by: STUDENT IN AN ORGANIZED HEALTH CARE EDUCATION/TRAINING PROGRAM

## 2024-11-08 PROCEDURE — 1036F TOBACCO NON-USER: CPT | Performed by: STUDENT IN AN ORGANIZED HEALTH CARE EDUCATION/TRAINING PROGRAM

## 2024-11-08 PROCEDURE — 3074F SYST BP LT 130 MM HG: CPT | Performed by: STUDENT IN AN ORGANIZED HEALTH CARE EDUCATION/TRAINING PROGRAM

## 2024-11-08 ASSESSMENT — ENCOUNTER SYMPTOMS
LIGHT-HEADEDNESS: 0
VOMITING: 0
CHILLS: 0
DIARRHEA: 0
DIAPHORESIS: 0
FEVER: 0
SHORTNESS OF BREATH: 0
DIZZINESS: 0
NAUSEA: 0

## 2024-11-15 ENCOUNTER — TELEMEDICINE (OUTPATIENT)
Dept: PHARMACY | Facility: HOSPITAL | Age: 34
End: 2024-11-15
Payer: COMMERCIAL

## 2024-11-15 NOTE — PROGRESS NOTES
Clinical Pharmacy Appointment    Patient ID: Bryant Goddard is a 34 y.o. male who presents for weight loss management    Pt is here for First appointment.     Referring Provider: Kane Wood DO  PCP: Kane Wood DO   Last visit with PCP: 11/08/2024, 08/02/2024   Next visit with PCP: 02/07/2025      Subjective     Interval History  Per PCP note 11/08/2024 - patient had been managing weight with diet and exercise. Had initially deferred meds. Nutrition services referral was placed as well as referral to clinical pharmacy for possible weight loss management and start of weight loss pharmacotherapy. Noted that if GLP-1 is not covered to start topiramate 50 mg/ day.     HPI  WEIGHT LOSS  BMI Readings from Last 3 Encounters:   11/08/24 42.13 kg/m²   08/06/24 36.01 kg/m²   08/02/24 41.50 kg/m²        Current weight: 269 lbs. (11/15/2024)    Lifestyle  Diet: 2-3 meals/day. Mainly lunch and dinner  BK: Skips  LN: Usually leftovers from dinner, whatever is at hand sometimes (chicken salad, cereal), fast food ([employer], [employer])  DN: protein (chicken or fish), carb (usually rice), vegetables (broccoli)  Snacks: chips, granola bars  Drinks: Diet coke mostly   Has worked with nutritionist/dietician? No - referral was placed by Dr. Wood  Physical Activity: sedentary job - often 10 hr days, has not been able to go the gym often      Other Potential Contributing Factors  Alcohol use: Average 5-8 drinks/week - usually drinks just on the weekends  Tobacco use: No - former, quit 2014  Other drug use: No  Medications that may cause weight gain:  none at this time  Mental health: No current concerns  Eating Disorders: Denies Hx of any eating disorder  Comorbidities: Comorbidities: sleep apnea not using an appliance  and hypertension controlled with oral meds      Non-Pharmacological Therapy  Weight loss techniques attempted:  Generally, self directed diet and exercise    Pharmacological Therapy  Current Medications:  "N/A  Previous Medications: N/A     Insurance coverage of weight-loss medications? No, weight loss medications are plan exclusion  Eligible for copay cards/programs? Yes, $650 for Zepbound/Wegovy, $400 for Zepbound vial    Medication Reconciliation:  No changes made at this time     Drug Interactions  No relevant drug interactions were noted.      Objective   Allergies   Allergen Reactions    Amoxicillin Unknown     Social History     Social History Narrative    Not on file      Medication Review  Current Outpatient Medications   Medication Instructions    hydrOXYzine HCL (ATARAX) 25 mg, oral, 2 times daily PRN    losartan (COZAAR) 50 mg, oral, Daily      Vitals  BP Readings from Last 2 Encounters:   11/08/24 114/80   08/06/24 141/86     BMI Readings from Last 1 Encounters:   11/08/24 42.13 kg/m²      Labs  A1C  No results found for: \"HGBA1C\"  BMP  Lab Results   Component Value Date    CALCIUM 9.4 07/26/2024     07/26/2024    K 4.0 07/26/2024    CO2 27 07/26/2024     07/26/2024    BUN 14 07/26/2024    CREATININE 0.81 07/26/2024    EGFR >90 07/26/2024     LFTs  Lab Results   Component Value Date    ALT 26 06/24/2024    AST 15 06/24/2024    ALKPHOS 62 06/24/2024    BILITOT 1.0 06/24/2024     FLP  Lab Results   Component Value Date    TRIG 128 06/24/2024    CHOL 140 06/24/2024    LDLF 82 07/21/2023    LDLCALC 73 06/24/2024    HDL 41.4 06/24/2024     Urine Microalbumin  No results found for: \"MICROALBCREA\"  Weight Management  Wt Readings from Last 3 Encounters:   11/08/24 122 kg (269 lb)   08/06/24 104 kg (229 lb 15 oz)   08/02/24 120 kg (265 lb)      There is no height or weight on file to calculate BMI.     Assessment/Plan   Problem List Items Addressed This Visit       BMI 40.0-44.9, adult (Multi)     Spoke with patient today about his current weight loss efforts. Patient has struggled with managing diet and physical exercise largely due to finding it hard to find the time to adjust eating habits and to " exercise. Patient expressed interest in weight loss pharmacotherapy in adjunct to diet and exercise.     Reviewed briefly mechanism of action of GLP-1 medications, mainly decreased appetite and increased feelings of fullness after eating. Discussed with patient his current insurance does not cover weight loss medications. Reviewed patient cost and different alternatives for the GLP-1 agonist therapy. Understandably, patient stated cost was too much of a barrier at this time and opted to trial oral option of topiramate discussed by Dr. Wood with the patient at last PCP visit 11/08/2024.    Will refer patient back to Dr. Wood for the management of oral weight loss pharmacotherapy.     Will follow up based on patient or provider request.           Continue all meds under the continuation of care with the referring provider and clinical pharmacy team.    Thank you,  Negar Franco (Suji), PharmD  PGY-1  Meds Pharmacy Resident     Verbal consent to manage patient's drug therapy was obtained from the patient. They were informed they may decline to participate or withdraw from participation in pharmacy services at any time.

## 2024-11-15 NOTE — ASSESSMENT & PLAN NOTE
Spoke with patient today about his current weight loss efforts. Patient has struggled with managing diet and physical exercise largely due to finding it hard to find the time to adjust eating habits and to exercise. Patient expressed interest in weight loss pharmacotherapy in adjunct to diet and exercise.     Reviewed briefly mechanism of action of GLP-1 medications, mainly decreased appetite and increased feelings of fullness after eating. Discussed with patient his current insurance does not cover weight loss medications. Reviewed patient cost and different alternatives for the GLP-1 agonist therapy. Understandably, patient stated cost was too much of a barrier at this time and opted to trial oral option of topiramate discussed by Dr. Wood with the patient at last PCP visit 11/08/2024.    Will refer patient back to Dr. Wood for the management of oral weight loss pharmacotherapy.     Will follow up based on patient or provider request.

## 2024-11-21 ENCOUNTER — PROCEDURE VISIT (OUTPATIENT)
Dept: SLEEP MEDICINE | Facility: HOSPITAL | Age: 34
End: 2024-11-21
Payer: COMMERCIAL

## 2024-11-21 DIAGNOSIS — R06.81 WITNESSED EPISODE OF APNEA: ICD-10-CM

## 2024-11-21 PROCEDURE — 95806 SLEEP STUDY UNATT&RESP EFFT: CPT | Performed by: GENERAL PRACTICE

## 2024-11-23 ENCOUNTER — OFFICE VISIT (OUTPATIENT)
Dept: URGENT CARE | Age: 34
End: 2024-11-23
Payer: COMMERCIAL

## 2024-11-23 VITALS
HEIGHT: 67 IN | BODY MASS INDEX: 42.38 KG/M2 | HEART RATE: 94 BPM | TEMPERATURE: 98.7 F | WEIGHT: 270 LBS | SYSTOLIC BLOOD PRESSURE: 148 MMHG | RESPIRATION RATE: 20 BRPM | OXYGEN SATURATION: 98 % | DIASTOLIC BLOOD PRESSURE: 92 MMHG

## 2024-11-23 DIAGNOSIS — L03.311 ABDOMINAL WALL CELLULITIS: Primary | ICD-10-CM

## 2024-11-23 RX ORDER — DOXYCYCLINE HYCLATE 100 MG
100 TABLET ORAL 2 TIMES DAILY
Qty: 14 TABLET | Refills: 0 | Status: SHIPPED | OUTPATIENT
Start: 2024-11-23 | End: 2024-11-30

## 2024-11-23 NOTE — PROGRESS NOTES
"Subjective   Patient ID: Bryant Goddard is a 34 y.o. male who presents for Acne (Inside of belly button x 3 days, pt popped pimple with metal piece, bled when popped pimple. since popping the pimple pt has had abd pain and fluid leak from belly button ).  HPI  Patient presents for suspected cellulitis.  Patient reports essentially expressing purulent discharge from a follicular infection around the navel approximately 3 days ago.  Shortly thereafter, the patient noticed spreading erythema radiating concentrically away from the navel.  No fever or chills.  Patient attempted antibiotic ointments and cleansing without change.  No other complaints.    Review of Systems    Constitutional:  See HPI    Integumentary: See HPI  Neurologic:  Alert and oriented X4, No numbness, No tingling.    All other systems are negative     Objective     BP (!) 148/92   Pulse 94   Temp 37.1 °C (98.7 °F)   Resp 20   Ht 1.702 m (5' 7\")   Wt 122 kg (270 lb)   SpO2 98%   BMI 42.29 kg/m²     Physical Exam    General:  Alert and oriented, No acute distress.    Eye:  Pupils are equal, round and reactive to light, Normal conjunctiva.    HENT:  Normocephalic,   Neck:  Supple    Respiratory: Respirations are non-labored   Musculoskeletal: Normal ROM and strength  Integumentary: Navel is cellulitic with a 6 cm x 8 cm area of erythema surrounding the navel; no induration or expressible discharge  Neurologic:  Alert, Oriented, Normal sensory, Cranial Nerves II-XII are grossly intact  Psychiatric:  Cooperative, Appropriate mood & affect.    Assessment/Plan   Exam is consistent with.  Annemarie-navel cellulitis.  Prescription for doxycycline.  Patient's clinical presentation is otherwise unremarkable at this time. Patient is discharged with instructions to follow-up with primary care or seek emergency medical attention for worsening symptoms or any new concerns.  Problem List Items Addressed This Visit    None  Visit Diagnoses       Abdominal wall " cellulitis    -  Primary    Relevant Medications    doxycycline (Vibra-Tabs) 100 mg tablet            Final diagnoses:   [L03.311] Abdominal wall cellulitis

## 2024-11-25 ENCOUNTER — APPOINTMENT (OUTPATIENT)
Dept: RADIOLOGY | Facility: HOSPITAL | Age: 34
End: 2024-11-25
Payer: COMMERCIAL

## 2024-11-25 ENCOUNTER — HOSPITAL ENCOUNTER (EMERGENCY)
Facility: HOSPITAL | Age: 34
Discharge: HOME | End: 2024-11-25
Attending: EMERGENCY MEDICINE
Payer: COMMERCIAL

## 2024-11-25 VITALS
TEMPERATURE: 97.2 F | BODY MASS INDEX: 42.38 KG/M2 | SYSTOLIC BLOOD PRESSURE: 139 MMHG | OXYGEN SATURATION: 96 % | HEIGHT: 67 IN | RESPIRATION RATE: 16 BRPM | HEART RATE: 82 BPM | WEIGHT: 270 LBS | DIASTOLIC BLOOD PRESSURE: 83 MMHG

## 2024-11-25 DIAGNOSIS — L03.316 CELLULITIS OF UMBILICUS: ICD-10-CM

## 2024-11-25 DIAGNOSIS — L02.216 CUTANEOUS ABSCESS OF UMBILICUS: Primary | ICD-10-CM

## 2024-11-25 PROCEDURE — 74176 CT ABD & PELVIS W/O CONTRAST: CPT | Performed by: RADIOLOGY

## 2024-11-25 PROCEDURE — 74176 CT ABD & PELVIS W/O CONTRAST: CPT

## 2024-11-25 PROCEDURE — 2500000001 HC RX 250 WO HCPCS SELF ADMINISTERED DRUGS (ALT 637 FOR MEDICARE OP): Performed by: EMERGENCY MEDICINE

## 2024-11-25 PROCEDURE — 99284 EMERGENCY DEPT VISIT MOD MDM: CPT | Mod: 25

## 2024-11-25 RX ORDER — OXYCODONE AND ACETAMINOPHEN 5; 325 MG/1; MG/1
1 TABLET ORAL ONCE
Status: COMPLETED | OUTPATIENT
Start: 2024-11-25 | End: 2024-11-25

## 2024-11-25 RX ORDER — SULFAMETHOXAZOLE AND TRIMETHOPRIM 800; 160 MG/1; MG/1
2 TABLET ORAL 2 TIMES DAILY
Qty: 40 TABLET | Refills: 0 | Status: SHIPPED | OUTPATIENT
Start: 2024-11-25 | End: 2024-12-05

## 2024-11-25 RX ORDER — HYDROCODONE BITARTRATE AND ACETAMINOPHEN 5; 325 MG/1; MG/1
1 TABLET ORAL EVERY 4 HOURS PRN
Qty: 18 TABLET | Refills: 0 | Status: SHIPPED | OUTPATIENT
Start: 2024-11-25 | End: 2024-11-28

## 2024-11-25 ASSESSMENT — PAIN DESCRIPTION - LOCATION: LOCATION: ABDOMEN

## 2024-11-25 ASSESSMENT — PAIN SCALES - GENERAL
PAINLEVEL_OUTOF10: 6

## 2024-11-25 ASSESSMENT — PAIN - FUNCTIONAL ASSESSMENT
PAIN_FUNCTIONAL_ASSESSMENT: 0-10
PAIN_FUNCTIONAL_ASSESSMENT: 0-10

## 2024-11-25 ASSESSMENT — COLUMBIA-SUICIDE SEVERITY RATING SCALE - C-SSRS
6. HAVE YOU EVER DONE ANYTHING, STARTED TO DO ANYTHING, OR PREPARED TO DO ANYTHING TO END YOUR LIFE?: NO
2. HAVE YOU ACTUALLY HAD ANY THOUGHTS OF KILLING YOURSELF?: NO
1. IN THE PAST MONTH, HAVE YOU WISHED YOU WERE DEAD OR WISHED YOU COULD GO TO SLEEP AND NOT WAKE UP?: NO

## 2024-11-25 ASSESSMENT — PAIN DESCRIPTION - ORIENTATION: ORIENTATION: MID

## 2024-11-25 ASSESSMENT — PAIN DESCRIPTION - DESCRIPTORS: DESCRIPTORS: SHARP

## 2024-11-25 ASSESSMENT — PAIN DESCRIPTION - PAIN TYPE
TYPE: ACUTE PAIN
TYPE: ACUTE PAIN

## 2024-11-25 ASSESSMENT — PAIN DESCRIPTION - FREQUENCY: FREQUENCY: CONSTANT/CONTINUOUS

## 2024-11-25 NOTE — ED PROVIDER NOTES
HPI   Chief Complaint   Patient presents with    Wound Check     I tried to pop something in my bellybutton with an jovany wrench,  but then I was afraid it got infected so I went to urgent care and they said I have cellulitis. I got my prescription filled on Sunday but it still hurts there.         History provided by:  Patient  History of Present Illness:  34-year-old male presents with pain to his bellybutton.  The patient states that he had a pimple to that area and he popped it with an Jovany wrench late last week.  He then went to urgent care and was placed on doxycycline for possible cellulitis.  He presents today because the area still hurts.  No fever or chills.  No nausea or vomiting.      PMFSH:   As per HPI, otherwise nurses notes reviewed in EMR.    Past Medical History:   Active Ambulatory Problems     Diagnosis Date Noted    Acute sinusitis 06/27/2023    Chronic pharyngitis 06/27/2023    Tonsillar hypertrophy 06/27/2023    Nevus 11/20/2018    Non morbid obesity due to excess calories 09/22/2017    TABBY (obstructive sleep apnea) 09/22/2017    Elevated blood pressure reading 07/02/2023    EKG abnormality 07/02/2023    Chest pain 07/02/2023    Routine health maintenance 07/02/2023    On pre-exposure prophylaxis for HIV 07/02/2023    MANUEL (generalized anxiety disorder) 07/02/2023    Healthcare maintenance 07/02/2023    Foot pain, bilateral 07/02/2023    Gastroesophageal reflux disease 07/02/2023    BMI 40.0-44.9, adult (Multi) 07/02/2023    Tobacco use 07/02/2023    Encounter for immunization 08/05/2023    Obesity, morbid (more than 100 lbs over ideal weight or BMI > 40) (Multi) 11/03/2023    Primary hypertension 08/02/2024    Witnessed episode of apnea 11/08/2024    Snoring 11/08/2024     Resolved Ambulatory Problems     Diagnosis Date Noted    No Resolved Ambulatory Problems     No Additional Past Medical History      Past Surgical History:   Past Surgical History:   Procedure Laterality Date    HIP SURGERY  Right     JOINT REPLACEMENT      WISDOM TOOTH EXTRACTION        Family History:   Family History   Problem Relation Name Age of Onset    Cancer Mother      Hypertension Father      Depression Maternal Grandmother        Social History:    Social History     Socioeconomic History    Marital status: Single     Spouse name: Not on file    Number of children: Not on file    Years of education: Not on file    Highest education level: Not on file   Occupational History    Not on file   Tobacco Use    Smoking status: Former     Current packs/day: 0.00     Average packs/day: 0.3 packs/day for 2.0 years (0.5 ttl pk-yrs)     Types: Cigarettes     Start date: 2012     Quit date: 2014     Years since quitting: 10.9     Passive exposure: Never    Smokeless tobacco: Never    Tobacco comments:     Doesn't remember last cigarette. Still hasn't smoked.    Vaping Use    Vaping status: Never Used   Substance and Sexual Activity    Alcohol use: Yes     Comment: 5-8 drinks on the weekends    Drug use: Never    Sexual activity: Not Currently     Partners: Male   Other Topics Concern    Not on file   Social History Narrative    Not on file     Social Drivers of Health     Financial Resource Strain: Not on file   Food Insecurity: Not on file   Transportation Needs: Not on file   Physical Activity: Not on file   Stress: Not on file   Social Connections: Not on file   Intimate Partner Violence: Not on file   Housing Stability: Not on file              Patient History   No past medical history on file.  Past Surgical History:   Procedure Laterality Date    HIP SURGERY Right     JOINT REPLACEMENT      WISDOM TOOTH EXTRACTION       Family History   Problem Relation Name Age of Onset    Cancer Mother      Hypertension Father      Depression Maternal Grandmother       Social History     Tobacco Use    Smoking status: Former     Current packs/day: 0.00     Average packs/day: 0.3 packs/day for 2.0 years (0.5 ttl pk-yrs)     Types: Cigarettes      Start date: 2012     Quit date: 2014     Years since quitting: 10.9     Passive exposure: Never    Smokeless tobacco: Never    Tobacco comments:     Doesn't remember last cigarette. Still hasn't smoked.    Vaping Use    Vaping status: Never Used   Substance Use Topics    Alcohol use: Yes     Comment: 5-8 drinks on the weekends    Drug use: Never       Physical Exam   ED Triage Vitals [11/25/24 0708]   Temperature Heart Rate Respirations BP   36.2 °C (97.2 °F) 90 16 168/87      Pulse Ox Temp Source Heart Rate Source Patient Position   98 % Temporal Monitor Sitting      BP Location FiO2 (%)     Right arm --       Physical Exam  Physical Exam:    ED Triage Vitals [11/25/24 0708]   Temperature Heart Rate Respirations BP   36.2 °C (97.2 °F) 90 16 168/87      Pulse Ox Temp Source Heart Rate Source Patient Position   98 % Temporal Monitor Sitting      BP Location FiO2 (%)     Right arm --         Constitutional: Vital signs per nursing notes.  Well developed, well nourished.  No acute distress.    Psychiatric: alert and oriented to person, place, and time; no abnormalities of mood or affect; memory intact  Eyes: PERRL; conjunctivae and lids normal; EOMI  Respiratory: normal respiratory effort and excursion; no rales, rhonchi, or wheezes; equal air entry  Cardiovascular: regular rate and rhythm; no murmurs, rubs or gallops; symmetric pulses; no edema; normal capillary refill; distal pulses present  Neurological: normal speech; CN II-XII grossly intact; normal motor and sensory function; no nystagmus; no pronator drift  GI: no masses, tenderness, rebound or guarding; no palpable, pulsatile mass; no organomegaly; no hernia; normal bowel sounds; (-) Hair´s sign; (-) McBurney´s sign; (-) CVA tenderness; except tenderness to palpation to the umbilical region with some associated erythema and yellowish discharge but no fluctuance  Lymphatic: no adenopathy of neck, groin  Musculoskeletal: normal gait and station; normal digits  and nails; no gross tendon or ligament injury; normal to palpation; normal strength/tone; neurovascular status intact; (-) Justyna´s sign; (-) straight leg raise  Skin: normal to inspection; normal to palpation; no rash; except as above  GCS: 15      ED Course & MDM   Diagnoses as of 11/25/24 0850   Cutaneous abscess of umbilicus   Cellulitis of umbilicus                 No data recorded     Lane Coma Scale Score: 15 (11/25/24 0706 : Maricel Joseph RN)                           Medical Decision Making  Medical Decision Making:    Differential Diagnoses Considered: Abscess, cellulitis     EKG:    Labs Reviewed - No data to display    CT abdomen pelvis wo IV contrast   Final Result   1. There is a small thick-walled and complex fluid collection seen in   the subcutaneous fat at the level of the umbilicus in the midline of   the anterior abdominal wall measuring 0.8 x 1.2 x 1.9 cm most likely   a very small abscess with associated cellulitis.   2. Hepatosplenomegaly with mild fatty infiltration of the liver.   3. Remote granulomatous disease.   4. There are a number of mesenteric lymph nodes identified which   appear somewhat prominent but are still within normal size limits.             MACRO:   None        Signed by: Kaylin Ramirez 11/25/2024 8:37 AM   Dictation workstation:   [id number]          Diagnostic testing considered:         Review of recent and relevant records:    ED Medication Administration:   ED Medication Administration from 11/25/2024 0652 to 11/25/2024 0850         Date/Time Order Dose Route Action Action by     11/25/2024 0815 EST oxyCODONE-acetaminophen (Percocet) 5-325 mg per tablet 1 tablet 1 tablet oral Given CHARLOTTE Carr            Prescription Medication Consideration/Given:     Social Determinants of Health Significantly Affecting Care:      Summary:    BP      Temp      Pulse     Resp      SpO2        Abnormal Labs Reviewed - No data to display    Diagnostic evaluation was completed.  CT of  the abdomen pelvis shows a small thick-walled and complex fluid collection seen in the subcutaneous fat at the level of the umbilicus in the midline of the anterior abdominal wall measuring 0.8 x 1.2 x 1.9 cm most likely a very small abscess with associated cellulitis.  Hepatosplenomegaly with mild fatty infiltration of the liver.  Remote granulomatous disease.  There are a number of mesenteric lymph nodes identified which appeared somewhat prominent but are still within normal size limits.    I suspected the patient does have a small umbilical abscess associated with cellulitis.  The patient has already been started on antibiotics.  I will change the antibiotic.  I will also treat him for his pain.  I will refer him to general surgery to follow-up with as the patient will likely require an incision and drainage.    I discussed the results and discharge plan with the patient and/or family/friend if present.  I emphasized the importance of follow up with the physician I referred them to in the timeframe recommended.  I explained reasons for the patient to return to the Emergency Department.  Questions were addressed.  The patient and/or family/friend expressed understanding.            Diagnoses as of 11/25/24 0821   Cutaneous abscess of umbilicus   Cellulitis of umbilicus         Procedure  Procedures     Severino HINKLE MD  11/25/24 4422

## 2024-12-02 RX ORDER — TOPIRAMATE 25 MG/1
25 TABLET ORAL DAILY
Qty: 30 TABLET | Refills: 2 | Status: SHIPPED | OUTPATIENT
Start: 2024-12-02 | End: 2025-03-02

## 2024-12-05 ENCOUNTER — APPOINTMENT (OUTPATIENT)
Dept: SURGERY | Facility: CLINIC | Age: 34
End: 2024-12-05
Payer: COMMERCIAL

## 2024-12-05 VITALS
BODY MASS INDEX: 41.44 KG/M2 | HEIGHT: 67 IN | HEART RATE: 86 BPM | DIASTOLIC BLOOD PRESSURE: 92 MMHG | OXYGEN SATURATION: 97 % | WEIGHT: 264 LBS | SYSTOLIC BLOOD PRESSURE: 162 MMHG

## 2024-12-05 DIAGNOSIS — L03.316 CELLULITIS OF UMBILICUS: ICD-10-CM

## 2024-12-05 DIAGNOSIS — L02.216 CUTANEOUS ABSCESS OF UMBILICUS: ICD-10-CM

## 2024-12-05 PROCEDURE — 3080F DIAST BP >= 90 MM HG: CPT | Performed by: SURGERY

## 2024-12-05 PROCEDURE — 3008F BODY MASS INDEX DOCD: CPT | Performed by: SURGERY

## 2024-12-05 PROCEDURE — 10060 I&D ABSCESS SIMPLE/SINGLE: CPT | Performed by: SURGERY

## 2024-12-05 PROCEDURE — 99203 OFFICE O/P NEW LOW 30 MIN: CPT | Performed by: SURGERY

## 2024-12-05 PROCEDURE — 3077F SYST BP >= 140 MM HG: CPT | Performed by: SURGERY

## 2024-12-05 PROCEDURE — 1036F TOBACCO NON-USER: CPT | Performed by: SURGERY

## 2024-12-05 ASSESSMENT — ENCOUNTER SYMPTOMS: WOUND: 1

## 2024-12-05 NOTE — PROGRESS NOTES
Subjective   Patient ID: Bryant Goddard is a 34 y.o. male who presents for Follow-up.  HPI  34-year-old male nondiabetic presented to the emergency room after trying to drain a umbilical cyst by himself placed on antibiotics with follow-up.  Overall he feels better much less drainage no fevers no chills  Review of Systems   Skin:  Positive for rash and wound.   All other systems reviewed and are negative.      Objective   Physical Exam  Skin:     Comments: 1 x 1 cm nodule at the umbilicus with fluid and mild tenderness     Physical Exam  Constitutional:       Appearance: Normal appearance.   HENT:      Head: Normocephalic and atraumatic.      Mouth/Throat:      Pharynx: Oropharynx is clear.   Eyes:      Pupils: Pupils are equal, round, and reactive to light.   Cardiovascular:      Rate and Rhythm: Normal rate and regular rhythm.   Pulmonary:      Effort: Pulmonary effort is normal.      Breath sounds: Normal breath sounds.   Abdominal:      General: Abdomen is flat. Bowel sounds are normal.      Palpations: Abdomen is soft.   Musculoskeletal:         General: Normal range of motion.      Cervical back: Normal range of motion.   Skin:     General: Skin is warm.   Neurological:      General: No focal deficit present.      Mental Status: She is alert. Mental status is at baseline.   Psychiatric:         Mood and Affect: Mood normal.       Assessment/Plan   After consent obtained local anesthetic infiltrated skin incision made some fluid evacuated no pus dressing applied patient tolerated procedure well will see back in 1 week and establish response         Jay Lyon MD 12/05/24 3:22 PM

## 2024-12-12 ENCOUNTER — APPOINTMENT (OUTPATIENT)
Dept: SURGERY | Facility: CLINIC | Age: 34
End: 2024-12-12
Payer: COMMERCIAL

## 2024-12-12 VITALS — BODY MASS INDEX: 41.44 KG/M2 | WEIGHT: 264 LBS | HEIGHT: 67 IN

## 2024-12-12 DIAGNOSIS — L02.216 CUTANEOUS ABSCESS OF UMBILICUS: Primary | ICD-10-CM

## 2024-12-12 PROCEDURE — 3008F BODY MASS INDEX DOCD: CPT | Performed by: SURGERY

## 2024-12-12 PROCEDURE — 1036F TOBACCO NON-USER: CPT | Performed by: SURGERY

## 2024-12-12 PROCEDURE — 99024 POSTOP FOLLOW-UP VISIT: CPT | Performed by: SURGERY

## 2024-12-12 NOTE — PROGRESS NOTES
Status post drainage periumbilical abscess improved    Small eschar nontender    Local care follow-up as needed if no better 4 weeks

## 2024-12-17 RX ORDER — TOPIRAMATE 50 MG/1
50 TABLET, FILM COATED ORAL DAILY
Qty: 30 TABLET | Refills: 2 | Status: SHIPPED | OUTPATIENT
Start: 2024-12-17 | End: 2025-03-17

## 2025-01-02 DIAGNOSIS — G47.33 OBSTRUCTIVE SLEEP APNEA: Primary | ICD-10-CM

## 2025-02-07 ENCOUNTER — APPOINTMENT (OUTPATIENT)
Dept: PRIMARY CARE | Facility: CLINIC | Age: 35
End: 2025-02-07
Payer: COMMERCIAL

## 2025-02-12 DIAGNOSIS — I10 PRIMARY HYPERTENSION: ICD-10-CM

## 2025-02-12 RX ORDER — LOSARTAN POTASSIUM 50 MG/1
50 TABLET ORAL DAILY
Qty: 30 TABLET | Refills: 0 | Status: SHIPPED | OUTPATIENT
Start: 2025-02-12

## 2025-02-13 ENCOUNTER — APPOINTMENT (OUTPATIENT)
Dept: PRIMARY CARE | Facility: CLINIC | Age: 35
End: 2025-02-13
Payer: COMMERCIAL

## 2025-02-25 ENCOUNTER — APPOINTMENT (OUTPATIENT)
Dept: PRIMARY CARE | Facility: CLINIC | Age: 35
End: 2025-02-25
Payer: COMMERCIAL

## 2025-02-25 VITALS
OXYGEN SATURATION: 99 % | WEIGHT: 256 LBS | HEIGHT: 67 IN | TEMPERATURE: 96.8 F | RESPIRATION RATE: 16 BRPM | BODY MASS INDEX: 40.18 KG/M2 | HEART RATE: 88 BPM | DIASTOLIC BLOOD PRESSURE: 80 MMHG | SYSTOLIC BLOOD PRESSURE: 122 MMHG

## 2025-02-25 DIAGNOSIS — R16.0 HEPATOMEGALY: Primary | ICD-10-CM

## 2025-02-25 DIAGNOSIS — R16.1 SPLENOMEGALY: ICD-10-CM

## 2025-02-25 DIAGNOSIS — F41.1 GAD (GENERALIZED ANXIETY DISORDER): ICD-10-CM

## 2025-02-25 DIAGNOSIS — I10 PRIMARY HYPERTENSION: ICD-10-CM

## 2025-02-25 DIAGNOSIS — G47.33 OSA (OBSTRUCTIVE SLEEP APNEA): ICD-10-CM

## 2025-02-25 DIAGNOSIS — K21.9 GASTROESOPHAGEAL REFLUX DISEASE, UNSPECIFIED WHETHER ESOPHAGITIS PRESENT: ICD-10-CM

## 2025-02-25 PROCEDURE — 3079F DIAST BP 80-89 MM HG: CPT | Performed by: STUDENT IN AN ORGANIZED HEALTH CARE EDUCATION/TRAINING PROGRAM

## 2025-02-25 PROCEDURE — 99214 OFFICE O/P EST MOD 30 MIN: CPT | Performed by: STUDENT IN AN ORGANIZED HEALTH CARE EDUCATION/TRAINING PROGRAM

## 2025-02-25 PROCEDURE — 1036F TOBACCO NON-USER: CPT | Performed by: STUDENT IN AN ORGANIZED HEALTH CARE EDUCATION/TRAINING PROGRAM

## 2025-02-25 PROCEDURE — 3074F SYST BP LT 130 MM HG: CPT | Performed by: STUDENT IN AN ORGANIZED HEALTH CARE EDUCATION/TRAINING PROGRAM

## 2025-02-25 PROCEDURE — 3008F BODY MASS INDEX DOCD: CPT | Performed by: STUDENT IN AN ORGANIZED HEALTH CARE EDUCATION/TRAINING PROGRAM

## 2025-02-25 ASSESSMENT — ENCOUNTER SYMPTOMS
VOMITING: 0
DIAPHORESIS: 0
DIARRHEA: 0
SHORTNESS OF BREATH: 0
LIGHT-HEADEDNESS: 0
DIZZINESS: 0
CHILLS: 0
NAUSEA: 0
FEVER: 0

## 2025-02-25 NOTE — PROGRESS NOTES
"Subjective   Patient ID: Bryant Goddard is a 34 y.o. male who presents for Follow-up.  HPI    Since our last appointment he had sleep study which showed moderate TABBY.  He also had periumbilical abscess drained by general surgery and will follow-up as needed.  He joined [employer] and was doing this. Stopped since had abscess and has [employer] membership. Hasn't exercised much recently.   Feels topiramate helps with his food cravings.   Mood is not worse, no dizziness, drowsiness, fatigue, SI, HI, decreased sweats, flushing or vision changes.     Review of Systems   Constitutional:  Negative for chills, diaphoresis and fever.   Respiratory:  Negative for shortness of breath.    Cardiovascular:  Negative for chest pain.   Gastrointestinal:  Negative for diarrhea, nausea and vomiting.   Neurological:  Negative for dizziness, syncope and light-headedness.       /80   Pulse 88   Temp 36 °C (96.8 °F)   Resp 16   Ht 1.702 m (5' 7\")   Wt 116 kg (256 lb)   SpO2 99%   BMI 40.10 kg/m²     Objective   Physical Exam  Vitals reviewed.   Constitutional:       General: He is not in acute distress.     Appearance: Normal appearance.   HENT:      Head: Normocephalic.   Cardiovascular:      Rate and Rhythm: Normal rate and regular rhythm.   Pulmonary:      Effort: Pulmonary effort is normal. No respiratory distress.      Breath sounds: Normal breath sounds.   Abdominal:      General: There is no distension.   Musculoskeletal:         General: No deformity.   Skin:     Coloration: Skin is not jaundiced.   Neurological:      Mental Status: He is alert.         Assessment/Plan   Problem List Items Addressed This Visit       TABBY (obstructive sleep apnea)    MANUEL (generalized anxiety disorder)    Gastroesophageal reflux disease    Primary hypertension    BMI 40.0-44.9, adult (Multi)     Other Visit Diagnoses       Hepatomegaly    -  Primary    Relevant Orders    Referral to Hepatology    Splenomegaly        Relevant Orders "    Referral to Hepatology          Hepatomegaly  Splenomegaly  - Refer to hepatology, noted on imaging    Fatigue  Snoring  Witnessed apnea  Moderate obstructive sleep apnea  - Referred to sleep medicine, advised to set up appointment due to risks of arrhythmias, HTN, and weight gain.   -Continue w/ diet and exercise     Hypertension  -Controlled on losartan, continue.   -He is trying to cut back on diet coke.  -As weight comes down hopefully losartan need to decrease as well.      Chest pain  EKG abnormality  -Lasts seconds, not major concern   -Echo normal.  -Resolved    GERD  - Was on omeprazole in the past. A little persistent recently.   - Discussed could try pepcid. Let us know if symptoms persist    MANUEL  -MANUEL-7 16-> 6 ->3 -> 4 and now is off lexapro. Uses hydroxyzine as needed which helps.   -Historically did well with lexapro 20mg/day and hydroxyzine.     BMI 41->40  -He will continue to work on diet and exercise, defers meds   -Discussed how diet, exercise, stress sleep play a role.  -Nutrition referral  -HSAT ordered  -Refer to [facility] pharmacy  -On topiramate 50 mg a day.  We decided to continue this dose as he has had effect with this and is lost about 13 to 14 pounds since last appointment.  Discussed side effects and if SI stop and let us know.   -F/u 3 months.      Health Maintenance  -Prostate Cancer Screening: Age 50  -Vaccinations: TDAP done 2023, due 2033. He defers flu vaccine.  Rec covid booster.   -Lung Cancer Screening:  Not indicated  -AAA Screening: Age 65  -Colonoscopy: Age 45  -Screen for HIV/Hep C: Reports screened in past.     Follow-up 3 months, sooner if needed.

## 2025-03-10 NOTE — ASSESSMENT & PLAN NOTE
Feel like his anxiety is needing to be addressed prior to discussion of weight loss.  We will continue to address this at follow-up visits.   coffee

## 2025-03-14 DIAGNOSIS — I10 PRIMARY HYPERTENSION: ICD-10-CM

## 2025-03-14 RX ORDER — LOSARTAN POTASSIUM 50 MG/1
50 TABLET ORAL DAILY
Qty: 30 TABLET | Refills: 0 | Status: SHIPPED | OUTPATIENT
Start: 2025-03-14

## 2025-03-28 ENCOUNTER — APPOINTMENT (OUTPATIENT)
Dept: PRIMARY CARE | Facility: CLINIC | Age: 35
End: 2025-03-28
Payer: COMMERCIAL

## 2025-03-28 DIAGNOSIS — Z71.3 DIETARY COUNSELING: Primary | ICD-10-CM

## 2025-03-28 PROCEDURE — 97802 MEDICAL NUTRITION INDIV IN: CPT | Performed by: DIETITIAN, REGISTERED

## 2025-03-28 NOTE — PROGRESS NOTES
Nutrition Assessment     Reason for Visit:  Bryant Goddard is a 35 y.o. male who presents for Nutrition Counseling (Wt loss)    Anthropometrics:            Food And Nutrient Intake:  Food and Nutrient History  Food and Nutrient History: Lost 15 lb in 3 mo - cycling, mediterranean diet. Topiramate - feels it's helping - less hungry. likes to cook but not always. Go out to eat. Wants to be healthier.  Energy Intake: Fair 50-75 %  Food Intolerance: none  Sleep Duration/Quality: 5-6 hrs disrupted (sleep apnea - referred to specialist)     Food Intake  Meal 1: skips 2/2 toprimate  Snacks: protein shake - 20g  Meal 2: fast food, out - burger fries, [employer]  Snacks : not often, occ chocolate  Meal 3: vegetable, protein, carb - chicken, beef, salmon, rice usually, always vegetable  Snacks: chips, string cheese, chocolate  Food Variety: Present  Fluid Intake: diet coke - tried doing less but then struggled, coke zero more now, coffee w/ sugar and cream, some water  Pattern of Alcohol Consumption: occasional, social                             Bioactive Substance Intake  Pattern of Alcohol Consumption: occasional, social                          Factors:                         Physical Activities:  Physical Activity  Physical Activity History: struggles with all/nothing thinking - does cycling for a month and then can't keep up that pace  Consistency: No           Knowledge Beliefs Attitudes and Behavior       Belief and Attitude Determination  Beliefs and Attitudes: Unscientific nutrition beliefs                               Nutrition Focused Physical Exam:           Energy Needs           Nutrition Diagnosis           Nutrition Interventions/Recommendations   Food and Nutrition Delivery  Meals & Snacks: General Healthful Diet  Nutrition Education  Goals: MNT for liver health.  Nutrition Counseling  Nutrition Counseling Strategies : Nutrition counseling based on motivational interviewing strategy  Goals: Discussed habit  "change framework: eating more plants, fruits and vegs, choosing whole grains when able, drinking more water and less caffienated drinks, increasing exercise as tolerated. Discussed avoiding diet culture and the all/nothing thinking he can find himself in at times. Discussed HAES and focusing on habits that support his health instead of focusing on weight. Discussed why focusing on weight can make it harder to continue habit change when weight doesn't change, feels like \"what's the point in continuing to exercise or eat 'right'\" - feels comfortable with goals for small habit changes and building on this. Answered questions.        There are no Patient Instructions on file for this visit.    Nutrition Monitoring and Evaluation   Food and Nutrient Intake  Monitoring and Evaluation Plan: Meal/snack pattern, Fiber intake  Knowledge Belief Attitude Determination   Monitoring and Evaluation Plan: Physical activity          Time Spent  Prep time on day of patient encounter: 5 minutes  Time spent directly with patient, family or caregiver: 45 minutes  Additional Time Spent on Patient Care Activities: 0 minutes  Documentation Time: 10 minutes  Other Time Spent: 0 minutes  Total: 60 minutes                          "

## 2025-04-21 DIAGNOSIS — I10 PRIMARY HYPERTENSION: ICD-10-CM

## 2025-04-21 RX ORDER — LOSARTAN POTASSIUM 50 MG/1
50 TABLET ORAL DAILY
Qty: 30 TABLET | Refills: 2 | Status: SHIPPED | OUTPATIENT
Start: 2025-04-21

## 2025-05-30 ENCOUNTER — APPOINTMENT (OUTPATIENT)
Dept: PRIMARY CARE | Facility: CLINIC | Age: 35
End: 2025-05-30
Payer: COMMERCIAL

## 2025-07-27 DIAGNOSIS — I10 PRIMARY HYPERTENSION: ICD-10-CM

## 2025-07-28 RX ORDER — LOSARTAN POTASSIUM 50 MG/1
50 TABLET ORAL DAILY
Qty: 30 TABLET | Refills: 0 | Status: SHIPPED | OUTPATIENT
Start: 2025-07-28

## 2025-08-08 ENCOUNTER — TELEPHONE (OUTPATIENT)
Dept: PRIMARY CARE | Facility: CLINIC | Age: 35
End: 2025-08-08

## 2025-08-08 ENCOUNTER — APPOINTMENT (OUTPATIENT)
Dept: PRIMARY CARE | Facility: CLINIC | Age: 35
End: 2025-08-08
Payer: COMMERCIAL

## 2025-08-08 VITALS
DIASTOLIC BLOOD PRESSURE: 89 MMHG | HEART RATE: 90 BPM | OXYGEN SATURATION: 98 % | SYSTOLIC BLOOD PRESSURE: 146 MMHG | BODY MASS INDEX: 41.12 KG/M2 | HEIGHT: 67 IN | WEIGHT: 262 LBS

## 2025-08-08 DIAGNOSIS — I10 PRIMARY HYPERTENSION: ICD-10-CM

## 2025-08-08 DIAGNOSIS — G47.33 MODERATE OBSTRUCTIVE SLEEP APNEA: ICD-10-CM

## 2025-08-08 DIAGNOSIS — Z00.00 HEALTHCARE MAINTENANCE: Primary | ICD-10-CM

## 2025-08-08 DIAGNOSIS — R16.1 SPLENOMEGALY: ICD-10-CM

## 2025-08-08 DIAGNOSIS — Z76.89 ENCOUNTER FOR WEIGHT MANAGEMENT: ICD-10-CM

## 2025-08-08 DIAGNOSIS — R16.0 HEPATOMEGALY: ICD-10-CM

## 2025-08-08 DIAGNOSIS — K21.9 GASTROESOPHAGEAL REFLUX DISEASE, UNSPECIFIED WHETHER ESOPHAGITIS PRESENT: ICD-10-CM

## 2025-08-08 DIAGNOSIS — F41.1 GAD (GENERALIZED ANXIETY DISORDER): ICD-10-CM

## 2025-08-08 DIAGNOSIS — H61.23 BILATERAL IMPACTED CERUMEN: ICD-10-CM

## 2025-08-08 PROCEDURE — 3077F SYST BP >= 140 MM HG: CPT | Performed by: STUDENT IN AN ORGANIZED HEALTH CARE EDUCATION/TRAINING PROGRAM

## 2025-08-08 PROCEDURE — 99395 PREV VISIT EST AGE 18-39: CPT | Performed by: STUDENT IN AN ORGANIZED HEALTH CARE EDUCATION/TRAINING PROGRAM

## 2025-08-08 PROCEDURE — 3079F DIAST BP 80-89 MM HG: CPT | Performed by: STUDENT IN AN ORGANIZED HEALTH CARE EDUCATION/TRAINING PROGRAM

## 2025-08-08 PROCEDURE — 3008F BODY MASS INDEX DOCD: CPT | Performed by: STUDENT IN AN ORGANIZED HEALTH CARE EDUCATION/TRAINING PROGRAM

## 2025-08-08 ASSESSMENT — PATIENT HEALTH QUESTIONNAIRE - PHQ9
2. FEELING DOWN, DEPRESSED OR HOPELESS: NOT AT ALL
1. LITTLE INTEREST OR PLEASURE IN DOING THINGS: NOT AT ALL
SUM OF ALL RESPONSES TO PHQ9 QUESTIONS 1 AND 2: 0

## 2025-08-08 ASSESSMENT — ENCOUNTER SYMPTOMS
DIAPHORESIS: 0
PALPITATIONS: 0
MYALGIAS: 0
DIARRHEA: 0
DYSURIA: 0
VOMITING: 0
UNEXPECTED WEIGHT CHANGE: 0
NAUSEA: 0
LIGHT-HEADEDNESS: 0
CHILLS: 0
FEVER: 0
SHORTNESS OF BREATH: 0
DIZZINESS: 0

## 2025-08-08 NOTE — PROGRESS NOTES
Subjective   Patient ID: Bryant Goddard is a 35 y.o. male who presents for Annual Exam (Annual Exam /Needs topiramate refilled, is out.).  History of Present Illness  The patient presents for a physical examination.    He reports inconsistency with his exercise regimen and dietary habits secondary to increased stress related to his mother's recent cancer diagnosis. He has not been compliant with his prescribed losartan regimen, taking it only twice weekly, although he did take it today. He mentions experiencing pyrosis in the mornings over the past few days, which he attributes to his unhealthy lifestyle choices. He continues to experience lethargy and has not pursued follow-up for his sleep apnea diagnosis. He has not been using hydroxyzine as needed and has discontinued Lexapro. He found his consultation with the nutritionist beneficial. He reports no fever, chills, diaphoresis, unexpected weight change, visual or auditory disturbances, dyspnea, chest pain, palpitations, peripheral edema, nausea, vomiting, diarrhea, dysuria, myalgia, rash, lightheadedness, dizziness, syncope, or unusual sensitivity to temperature changes. He possesses a blood pressure cuff and a pulse oximeter at home.    He has run out of topiramate and has not been taking it consistently.    His mother was diagnosed with bladder cancer. There is no family history of prostate cancer or colon cancer.    Review of Systems   Constitutional:  Negative for chills, diaphoresis, fever and unexpected weight change.   HENT:  Negative for hearing loss.    Eyes:  Negative for visual disturbance.   Respiratory:  Negative for shortness of breath.    Cardiovascular:  Negative for chest pain, palpitations and leg swelling.   Gastrointestinal:  Negative for diarrhea, nausea and vomiting.   Endocrine: Negative for cold intolerance and heat intolerance.   Genitourinary:  Negative for dysuria, scrotal swelling and testicular pain.   Musculoskeletal:  Negative for  "myalgias.   Skin:  Negative for rash.   Neurological:  Negative for dizziness, syncope and light-headedness.       Objective     /89   Pulse 90   Ht 1.702 m (5' 7\")   Wt 119 kg (262 lb)   SpO2 98%   BMI 41.04 kg/m²      Physical Exam  Ears: Cerumen noted bilaterally.  GENERAL: Alert, cooperative, well developed, no acute distress, no diaphoresis.  HEENT: Normocephalic, normal oropharynx, moist mucous membranes, ear canals and tympanic membranes normal bilaterally, no oral lesions, no oral erythema.   NECK: Supple, trachea midline, no cervical lymphadenopathy.  CHEST: Clear to auscultation bilaterally, no wheezes, rhonchi, or rales.  CARDIOVASCULAR: Normal heart rate and rhythm, S1 and S2 normal without murmurs, rubs, or gallops.  ABDOMEN: Soft, non-tender, non-distended, normal bowel sounds present in all four quadrants. No rebound or guarding.  EXTREMITIES: No cyanosis or edema.  NEUROLOGICAL: Moves all extremities without gross motor deficit.  PSYCH: Mood and affect normal.       Assessment & Plan  Hypertension  - BP elevated today  - Inconsistent with losartan 50 mg  - Advised regular losartan use and to inform clinic if BP >130/90    Heartburn  - Reports severe heartburn over the past few days  - Advised omeprazole for a couple of weeks  - Recommended stress reduction and avoiding triggers  - F/u if not better in a few weeks    Weight management  - Discussed weight loss injections  - Pharmacy team will check insurance coverage for weight loss medications  - Advised diet rich in lean proteins, vegetables, fruits, low in carbs and sugars, and regular exercise  - F/u 3 months    Sleep apnea  - Referral to sleep medicine still valid  - Advised to see sleep medicine specialist    Liver enlargement  Splenomegaly  - Referral to hepatology still valid  - Advised follow-up with liver specialist  - No major issues or symptoms reported today    Cerumen impaction  - Wax present bilaterally  - Advised Debrox OTC, " 4 drops BID for 4 days  - If wax does not clear, ENT referral will be considered    Anxiety  - Controlled, not a major issue right now.  Has hydroxyzine.    Follow-up  - The patient will follow up in 3 months, fasting labs ordered  - CPE 1 year    Health Maintenance  -Prostate Cancer Screening: Age 50  -Vaccinations: TDAP done 2023, due 2033. He defers flu vaccine.  Rec covid booster.   -Lung Cancer Screening:  Not indicated  -AAA Screening: Age 65  -Colonoscopy: Age 45  -Screen for HIV/Hep C: Reports screened in past.      CPE completed.  Advised to keep a heart healthy, low fat  diet with fruits and veggies like Mediterranean diet.  Advised on the importance of exercise and maintaining 150 minutes of exercise per week (30 minutes per day 5 days a week).  Advised on regular eye and dental visits.  Discussed age appropriate cancer screening, immunizations and recommendations given.  Discussed avoiding illicit drugs and tobacco. Advised on appropriate use of alcohol.  Advised to wear seat belt.      Follow-up 3 months, sooner if needed.      Results  Diagnostic Testing   - Sleep study: Moderate sleep apnea       Kane Wodo DO     This medical note was created with the assistance of artificial intelligence (AI) for documentation purposes. The content has been reviewed and confirmed by the healthcare provider for accuracy and completeness. Patient consented to the use of audio recording and use of AI during their visit.

## 2025-08-08 NOTE — TELEPHONE ENCOUNTER
Left a message for the patient regarding updated prescription insurance.     Will review coverage for GLP-1 therapy for moderate TABBY diagnosis when coverage information is provided.

## 2025-08-08 NOTE — PATIENT INSTRUCTIONS
Dr. Hossein Cosme-Sleep Medicine  [address] [place] [address] [id number]  [place] [id number] [phone]

## 2025-08-14 ENCOUNTER — TELEMEDICINE (OUTPATIENT)
Dept: PHARMACY | Facility: HOSPITAL | Age: 35
End: 2025-08-14
Payer: COMMERCIAL

## 2025-08-14 DIAGNOSIS — G47.33 MODERATE OBSTRUCTIVE SLEEP APNEA: ICD-10-CM

## 2025-08-21 ENCOUNTER — APPOINTMENT (OUTPATIENT)
Dept: PHARMACY | Facility: HOSPITAL | Age: 35
End: 2025-08-21
Payer: COMMERCIAL

## 2025-08-21 DIAGNOSIS — G47.33 MODERATE OBSTRUCTIVE SLEEP APNEA: ICD-10-CM

## 2025-08-29 DIAGNOSIS — I10 PRIMARY HYPERTENSION: ICD-10-CM

## 2025-08-29 RX ORDER — LOSARTAN POTASSIUM 50 MG/1
50 TABLET ORAL DAILY
Qty: 30 TABLET | Refills: 3 | Status: SHIPPED | OUTPATIENT
Start: 2025-08-29

## 2026-08-14 ENCOUNTER — APPOINTMENT (OUTPATIENT)
Dept: PRIMARY CARE | Facility: CLINIC | Age: 36
End: 2026-08-14
Payer: COMMERCIAL